# Patient Record
Sex: MALE | Race: WHITE | Employment: FULL TIME | URBAN - METROPOLITAN AREA
[De-identification: names, ages, dates, MRNs, and addresses within clinical notes are randomized per-mention and may not be internally consistent; named-entity substitution may affect disease eponyms.]

---

## 2017-06-06 ENCOUNTER — HOSPITAL ENCOUNTER (EMERGENCY)
Facility: CLINIC | Age: 52
Discharge: HOME | End: 2017-06-07
Attending: EMERGENCY MEDICINE | Admitting: EMERGENCY MEDICINE
Payer: COMMERCIAL

## 2017-06-06 DIAGNOSIS — R00.2 PALPITATIONS: ICD-10-CM

## 2017-06-06 PROCEDURE — 93308 TTE F-UP OR LMTD: CPT

## 2017-06-06 PROCEDURE — 93010 ELECTROCARDIOGRAM REPORT: CPT | Performed by: EMERGENCY MEDICINE

## 2017-06-06 PROCEDURE — 80048 BASIC METABOLIC PNL TOTAL CA: CPT | Performed by: EMERGENCY MEDICINE

## 2017-06-06 PROCEDURE — 96360 HYDRATION IV INFUSION INIT: CPT

## 2017-06-06 PROCEDURE — 99285 EMERGENCY DEPT VISIT HI MDM: CPT | Mod: 25 | Performed by: EMERGENCY MEDICINE

## 2017-06-06 PROCEDURE — 99285 EMERGENCY DEPT VISIT HI MDM: CPT | Mod: 25

## 2017-06-06 PROCEDURE — 93308 TTE F-UP OR LMTD: CPT | Mod: 26 | Performed by: EMERGENCY MEDICINE

## 2017-06-06 PROCEDURE — 84484 ASSAY OF TROPONIN QUANT: CPT | Performed by: EMERGENCY MEDICINE

## 2017-06-06 PROCEDURE — 85025 COMPLETE CBC W/AUTO DIFF WBC: CPT | Performed by: EMERGENCY MEDICINE

## 2017-06-06 PROCEDURE — 93005 ELECTROCARDIOGRAM TRACING: CPT

## 2017-06-06 NOTE — ED AVS SNAPSHOT
[facility]    [address] [address]    Phone:  [phone]    Fax:  [phone]                                       Mark Nathan   MRN: [id number]    Department:  [facility]   Date of Visit:  6/6/2017           Patient Information     Date Of Birth          1965        Your diagnoses for this visit were:     Palpitations        You were seen by Jalil Oneill MD.        Discharge Instructions       Return to the emergency department if you have worsening symptoms, difficulty breathing, or other concerns.  Otherwise follow up in clinic.    Future Appointments        Provider Department Dept Phone Center    6/7/2017 7:00 AM Jose Antonio Lao MD [facility] [phone] [facility]      24 Hour Appointment Hotline       To make an appointment at any [facility], call [phone]). If you don't have a family doctor or clinic, we will help you find one. [facility] are conveniently located to serve the needs of you and your family.             Review of your medicines      Our records show that you are taking the medicines listed below. If these are incorrect, please call your family doctor or clinic.        Dose / Directions Last dose taken    ASPIRIN PO   Dose:  81 mg        Take 81 mg by mouth daily   Refills:  0        BOOST 100 CALORIE SMART PO        Refills:  0        buPROPion 150 MG 12 hr tablet   Commonly known as:  WELLBUTRIN SR        Refills:  10        CENTRUM ULTRA MENS PO   Dose:  1 tablet        Take 1 tablet by mouth   Refills:  0        folic acid-vit B6-vit B12 0.8-10-0.115 MG Tabs per tablet   Commonly known as:  FOLGARD   Dose:  1 tablet        Take 1 tablet by mouth daily   Refills:  0        nicotine 10 MG Inhaler   Commonly known as:  NICOTROL   Dose:  1 cartridge   Quantity:  180 Box        Inhale 1 cartridge into the lungs daily as needed for smoking cessation   Refills:   11        VITAMIN B-1 PO   Dose:  100 mg        Take 100 mg by mouth daily   Refills:  0        VITAMIN D3 PO   Dose:  1000 Units        Take 1,000 Units by mouth daily   Refills:  0                Procedures and tests performed during your visit     Procedure/Test Number of Times Performed    Basic metabolic panel 1    CBC with platelets differential 1    EKG 12 lead 1    POC US ECHO LIMITED 1    Peripheral IV catheter 1    Troponin I 2    XR Chest 2 Views 1      Orders Needing Specimen Collection     None      Pending Results     No orders found for last 3 day(s).            Pending Culture Results     No orders found for last 3 day(s).            Pending Results Instructions     If you had any lab results that were not finalized at the time of your Discharge, you can call the ED Lab Result RN at [phone]. You will be contacted by this team for any positive Lab results or changes in treatment. The nurses are available 7 days a week from 10A to 6:30P.  You can leave a message 24 hours per day and they will return your call.        Test Results From Your Hospital Stay        6/7/2017 12:08 AM      [facility] Procedure Note      Limited Bedside ED Cardiac Ultrasound:    PROCEDURE: PERFORMED BY: Dr. Jalil Oneill  INDICATIONS/SYMPTOM:  Chest Pain  PROBE: Cardiac phased array probe  BODY LOCATION: Chest  FINDINGS:   The ultrasound was performed utilizing the parasternal long axis, parasternal short axis and apical 4 chamber views.  Cardiac contractility:  Present  Gross estimation of cardiac kinesis: normal  Pericardial Effusion:  None  RV:LV ratio: LV > RV  INTERPRETATION:    Chamber size and motion were grossly normal with LV > RV, normal cardiac kinesis.  No pericardial effusion was found.   IMAGE DOCUMENTATION: Images were archived to PACs system.             6/7/2017 12:24 AM      Component Results     Component Value Ref Range & Units Status    Troponin I ES  0.000 - 0.045 ug/L  Final    <0.015  The 99th percentile for upper reference range is 0.045 ug/L.  Troponin values in   the range of 0.045 - 0.120 ug/L may be associated with risks of adverse   clinical events.           6/7/2017 12:24 AM      Component Results     Component Value Ref Range & Units Status    Sodium 137 133 - 144 mmol/L Final    Potassium 3.6 3.4 - 5.3 mmol/L Final    Chloride 105 94 - 109 mmol/L Final    Carbon Dioxide 24 20 - 32 mmol/L Final    Anion Gap 8 3 - 14 mmol/L Final    Glucose 175 (H) 70 - 99 mg/dL Final    Urea Nitrogen 15 7 - 30 mg/dL Final    Creatinine 1.13 0.66 - 1.25 mg/dL Final    GFR Estimate 68 >60 mL/min/1.7m2 Final    Non  GFR Calc    GFR Estimate If Black 83 >60 mL/min/1.7m2 Final    African American GFR Calc    Calcium 7.7 (L) 8.5 - 10.1 mg/dL Final         6/7/2017 12:28 AM      Component Results     Component Value Ref Range & Units Status    WBC 6.9 4.0 - 11.0 10e9/L Final    RBC Count 4.62 4.4 - 5.9 10e12/L Final    Hemoglobin 16.0 13.3 - 17.7 g/dL Final    Hematocrit 44.7 40.0 - 53.0 % Final    MCV 97 78 - 100 fl Final    MCH 34.6 (H) 26.5 - 33.0 pg Final    MCHC 35.8 31.5 - 36.5 g/dL Final    RDW 12.0 10.0 - 15.0 % Final    Platelet Count 221 150 - 450 10e9/L Final    Diff Method Automated Method  Final    % Neutrophils 54.0 % Final    % Lymphocytes 35.7 % Final    % Monocytes 7.2 % Final    % Eosinophils 2.2 % Final    % Basophils 0.6 % Final    % Immature Granulocytes 0.3 % Final    Absolute Neutrophil 3.7 1.6 - 8.3 10e9/L Final    Absolute Lymphocytes 2.5 0.8 - 5.3 10e9/L Final    Absolute Monocytes 0.5 0.0 - 1.3 10e9/L Final    Absolute Eosinophils 0.2 0.0 - 0.7 10e9/L Final    Absolute Basophils 0.0 0.0 - 0.2 10e9/L Final    Abs Immature Granulocytes 0.0 0 - 0.4 10e9/L Final         6/7/2017  2:54 AM      Narrative     CHEST 2 VIEWS  6/7/2017 12:32 AM     HISTORY: Chest pain. Palpitations. Shortness of breath.    COMPARISON: 10/18/2016.    FINDINGS: The lungs are  clear. Normal-sized cardiac silhouette.        Impression     IMPRESSION: No evidence of active cardiopulmonary disease.    HANNY LORENZO MD         6/7/2017  2:55 AM      Component Results     Component Value Ref Range & Units Status    Troponin I ES  0.000 - 0.045 ug/L Final    <0.015  The 99th percentile for upper reference range is 0.045 ug/L.  Troponin values in   the range of 0.045 - 0.120 ug/L may be associated with risks of adverse   clinical events.                  Thank you for choosing [facility]       Thank you for choosing [facility] for your care. Our goal is always to provide you with excellent care. Hearing back from our patients is one way we can continue to improve our services. Please take a few minutes to complete the written survey that you may receive in the mail after you visit with us. Thank you!        Mobile [employer] Information     [employer] gives you secure access to your electronic health record. If you see a primary care provider, you can also send messages to your care team and make appointments. If you have questions, please call your primary care clinic.  If you do not have a primary care provider, please call [phone] and they will assist you.        Care EveryWhere ID     This is your Care EveryWhere ID. This could be used by other organizations to access your [facility] medical records  [id number]        After Visit Summary       This is your record. Keep this with you and show to your community pharmacist(s) and doctor(s) at your next visit.

## 2017-06-06 NOTE — ED AVS SNAPSHOT
[facility]    [address] [address]    Phone:  [phone]    Fax:  [phone]                                       Mark Nathan   MRN: [id number]    Department:  [facility]   Date of Visit:  6/6/2017           After Visit Summary Signature Page     I have received my discharge instructions, and my questions have been answered. I have discussed any challenges I see with this plan with the nurse or doctor.    ..........................................................................................................................................  Patient/Patient Representative Signature      ..........................................................................................................................................  Patient Representative Print Name and Relationship to Patient    ..................................................               ................................................  Date                                            Time    ..........................................................................................................................................  Reviewed by Signature/Title    ...................................................              ..............................................  Date                                                            Time

## 2017-06-07 ENCOUNTER — OFFICE VISIT (OUTPATIENT)
Dept: FAMILY MEDICINE | Facility: CLINIC | Age: 52
End: 2017-06-07
Payer: COMMERCIAL

## 2017-06-07 ENCOUNTER — APPOINTMENT (OUTPATIENT)
Dept: GENERAL RADIOLOGY | Facility: CLINIC | Age: 52
End: 2017-06-07
Attending: EMERGENCY MEDICINE
Payer: COMMERCIAL

## 2017-06-07 VITALS
BODY MASS INDEX: 24.13 KG/M2 | HEIGHT: 68 IN | WEIGHT: 159.2 LBS | SYSTOLIC BLOOD PRESSURE: 133 MMHG | TEMPERATURE: 98.2 F | DIASTOLIC BLOOD PRESSURE: 81 MMHG | OXYGEN SATURATION: 98 % | HEART RATE: 65 BPM

## 2017-06-07 VITALS
DIASTOLIC BLOOD PRESSURE: 83 MMHG | RESPIRATION RATE: 15 BRPM | SYSTOLIC BLOOD PRESSURE: 122 MMHG | OXYGEN SATURATION: 98 % | TEMPERATURE: 98 F | HEART RATE: 80 BPM

## 2017-06-07 DIAGNOSIS — R00.2 PALPITATIONS: ICD-10-CM

## 2017-06-07 DIAGNOSIS — Z72.0 TOBACCO ABUSE: ICD-10-CM

## 2017-06-07 DIAGNOSIS — R73.09 ELEVATED GLUCOSE: ICD-10-CM

## 2017-06-07 DIAGNOSIS — R53.83 FATIGUE, UNSPECIFIED TYPE: Primary | ICD-10-CM

## 2017-06-07 LAB
ANION GAP SERPL CALCULATED.3IONS-SCNC: 8 MMOL/L (ref 3–14)
BASOPHILS # BLD AUTO: 0 10E9/L (ref 0–0.2)
BASOPHILS NFR BLD AUTO: 0.6 %
BUN SERPL-MCNC: 15 MG/DL (ref 7–30)
CALCIUM SERPL-MCNC: 7.7 MG/DL (ref 8.5–10.1)
CHLORIDE SERPL-SCNC: 105 MMOL/L (ref 94–109)
CO2 SERPL-SCNC: 24 MMOL/L (ref 20–32)
CREAT SERPL-MCNC: 1.13 MG/DL (ref 0.66–1.25)
DIFFERENTIAL METHOD BLD: ABNORMAL
EOSINOPHIL # BLD AUTO: 0.2 10E9/L (ref 0–0.7)
EOSINOPHIL NFR BLD AUTO: 2.2 %
ERYTHROCYTE [DISTWIDTH] IN BLOOD BY AUTOMATED COUNT: 12 % (ref 10–15)
GFR SERPL CREATININE-BSD FRML MDRD: 68 ML/MIN/1.7M2
GLUCOSE SERPL-MCNC: 109 MG/DL (ref 70–99)
GLUCOSE SERPL-MCNC: 175 MG/DL (ref 70–99)
HBA1C MFR BLD: 5.7 % (ref 4.3–6)
HCT VFR BLD AUTO: 44.7 % (ref 40–53)
HGB BLD-MCNC: 16 G/DL (ref 13.3–17.7)
IMM GRANULOCYTES # BLD: 0 10E9/L (ref 0–0.4)
IMM GRANULOCYTES NFR BLD: 0.3 %
LYMPHOCYTES # BLD AUTO: 2.5 10E9/L (ref 0.8–5.3)
LYMPHOCYTES NFR BLD AUTO: 35.7 %
MCH RBC QN AUTO: 34.6 PG (ref 26.5–33)
MCHC RBC AUTO-ENTMCNC: 35.8 G/DL (ref 31.5–36.5)
MCV RBC AUTO: 97 FL (ref 78–100)
MONOCYTES # BLD AUTO: 0.5 10E9/L (ref 0–1.3)
MONOCYTES NFR BLD AUTO: 7.2 %
NEUTROPHILS # BLD AUTO: 3.7 10E9/L (ref 1.6–8.3)
NEUTROPHILS NFR BLD AUTO: 54 %
PLATELET # BLD AUTO: 221 10E9/L (ref 150–450)
POTASSIUM SERPL-SCNC: 3.6 MMOL/L (ref 3.4–5.3)
RBC # BLD AUTO: 4.62 10E12/L (ref 4.4–5.9)
SODIUM SERPL-SCNC: 137 MMOL/L (ref 133–144)
TROPONIN I SERPL-MCNC: NORMAL UG/L (ref 0–0.04)
TROPONIN I SERPL-MCNC: NORMAL UG/L (ref 0–0.04)
TSH SERPL DL<=0.005 MIU/L-ACNC: 1.73 MU/L (ref 0.4–4)
WBC # BLD AUTO: 6.9 10E9/L (ref 4–11)

## 2017-06-07 PROCEDURE — 25000132 ZZH RX MED GY IP 250 OP 250 PS 637: Performed by: EMERGENCY MEDICINE

## 2017-06-07 PROCEDURE — 82947 ASSAY GLUCOSE BLOOD QUANT: CPT | Performed by: INTERNAL MEDICINE

## 2017-06-07 PROCEDURE — 84443 ASSAY THYROID STIM HORMONE: CPT | Performed by: INTERNAL MEDICINE

## 2017-06-07 PROCEDURE — 86618 LYME DISEASE ANTIBODY: CPT | Performed by: INTERNAL MEDICINE

## 2017-06-07 PROCEDURE — 86753 PROTOZOA ANTIBODY NOS: CPT | Mod: 90 | Performed by: INTERNAL MEDICINE

## 2017-06-07 PROCEDURE — 99214 OFFICE O/P EST MOD 30 MIN: CPT | Performed by: INTERNAL MEDICINE

## 2017-06-07 PROCEDURE — 96360 HYDRATION IV INFUSION INIT: CPT

## 2017-06-07 PROCEDURE — 25000128 H RX IP 250 OP 636: Performed by: EMERGENCY MEDICINE

## 2017-06-07 PROCEDURE — 71020 XR CHEST 2 VW: CPT

## 2017-06-07 PROCEDURE — 99000 SPECIMEN HANDLING OFFICE-LAB: CPT | Performed by: INTERNAL MEDICINE

## 2017-06-07 PROCEDURE — 86666 EHRLICHIA ANTIBODY: CPT | Mod: 90 | Performed by: INTERNAL MEDICINE

## 2017-06-07 PROCEDURE — 36415 COLL VENOUS BLD VENIPUNCTURE: CPT | Performed by: INTERNAL MEDICINE

## 2017-06-07 PROCEDURE — 83036 HEMOGLOBIN GLYCOSYLATED A1C: CPT | Performed by: INTERNAL MEDICINE

## 2017-06-07 PROCEDURE — 84484 ASSAY OF TROPONIN QUANT: CPT | Performed by: EMERGENCY MEDICINE

## 2017-06-07 RX ORDER — ASPIRIN 81 MG/1
324 TABLET, CHEWABLE ORAL ONCE
Status: COMPLETED | OUTPATIENT
Start: 2017-06-07 | End: 2017-06-07

## 2017-06-07 RX ADMIN — SODIUM CHLORIDE 1000 ML: 9 INJECTION, SOLUTION INTRAVENOUS at 00:11

## 2017-06-07 RX ADMIN — ASPIRIN 81 MG 324 MG: 81 TABLET ORAL at 00:12

## 2017-06-07 NOTE — ED NOTES
Patient feels like he is in a cloudy fog when he fells dizzy  Patient is very anxious patient feels like he can not take a deep breath  Patient is alert  Offers feelings of  palpitation  Patient was at home watching TV when it started. Patient did take and 81 mg  ASA . Feeling weak and dizzy over last week end

## 2017-06-07 NOTE — NURSING NOTE
"Chief Complaint   Patient presents with     Fatigue     x 3 weeks, dizzy, dull headache        Initial /81 (BP Location: Right arm, Patient Position: Chair, Cuff Size: Adult Regular)  Pulse 65  Temp 98.2  F (36.8  C) (Tympanic)  Ht 5' 8\" (1.727 m)  Wt 159 lb 3.2 oz (72.2 kg)  SpO2 98%  BMI 24.21 kg/m2 Estimated body mass index is 24.21 kg/(m^2) as calculated from the following:    Height as of this encounter: 5' 8\" (1.727 m).    Weight as of this encounter: 159 lb 3.2 oz (72.2 kg).  Medication Reconciliation: complete   Hanh ROCHE CMA (AAMA)    "

## 2017-06-07 NOTE — MR AVS SNAPSHOT
"              After Visit Summary   6/7/2017    Mark Nathan    MRN: [id number]           Patient Information     Date Of Birth          1965        Visit Information        Provider Department      6/7/2017 7:00 AM Jose Antonio Lao MD [facility]        Today's Diagnoses     Fatigue, unspecified type    -  1    Palpitations           Follow-ups after your visit        Who to contact     If you have questions or need follow up information about today's clinic visit or your schedule please contact [facility] directly at [phone].  Normal or non-critical lab and imaging results will be communicated to you by Jocooshart, letter or phone within 4 business days after the clinic has received the results. If you do not hear from us within 7 days, please contact the clinic through [employer] or phone. If you have a critical or abnormal lab result, we will notify you by phone as soon as possible.  Submit refill requests through [employer] or call your pharmacy and they will forward the refill request to us. Please allow 3 business days for your refill to be completed.          Additional Information About Your Visit        MyChart Information     [employer] gives you secure access to your electronic health record. If you see a primary care provider, you can also send messages to your care team and make appointments. If you have questions, please call your primary care clinic.  If you do not have a primary care provider, please call [phone] and they will assist you.        Care EveryWhere ID     This is your Care EveryWhere ID. This could be used by other organizations to access your [facility] medical records  [id number]        Your Vitals Were     Pulse Temperature Height Pulse Oximetry BMI (Body Mass Index)       65 98.2  F (36.8  C) (Tympanic) 5' 8\" (1.727 m) 98% 24.21 kg/m2        Blood Pressure from Last 3 Encounters:   06/07/17 133/81   06/07/17 122/83   10/18/16 121/84    Weight from " Last 3 Encounters:   06/07/17 159 lb 3.2 oz (72.2 kg)   10/18/16 160 lb (72.6 kg)   03/21/16 165 lb (74.8 kg)              We Performed the Following     Anaplasma phagocytoph antibody IgG IgM     Babesia antibody IgG IgM     Glucose     Lyme Disease Bisi with reflex to WB Serum     TSH with free T4 reflex        Primary Care Provider    [facility]                Thank you!     Thank you for choosing [facility]  for your care. Our goal is always to provide you with excellent care. Hearing back from our patients is one way we can continue to improve our services. Please take a few minutes to complete the written survey that you may receive in the mail after your visit with us. Thank you!             Your Updated Medication List - Protect others around you: Learn how to safely use, store and throw away your medicines at www.disposemymeds.org.          This list is accurate as of: 6/7/17  7:29 AM.  Always use your most recent med list.                   Brand Name Dispense Instructions for use    ASPIRIN PO      Take 81 mg by mouth daily       BOOST 100 CALORIE SMART PO          buPROPion 150 MG 12 hr tablet    WELLBUTRIN SR         CENTRUM ULTRA MENS PO      Take 1 tablet by mouth       folic acid-vit B6-vit B12 0.8-10-0.115 MG Tabs per tablet    FOLGARD     Take 1 tablet by mouth daily       nicotine 10 MG Inhaler    NICOTROL    180 Box    Inhale 1 cartridge into the lungs daily as needed for smoking cessation       VITAMIN B-1 PO      Take 100 mg by mouth daily       VITAMIN D3 PO      Take 1,000 Units by mouth daily

## 2017-06-07 NOTE — ED NOTES
No complaint of pain in neck or face. Does have some difficulty focusing if he turns to fast  Mild headache  Complains of palpitations constantly.  Patient is a smoker  Patient denies pain with inspiration.  States has HX of numbness in feet  Has been checked for this in past. Has some joint pain occasionally.  No abdominal pain . States that he was bite by deer ticks one month ago. He has nausea off and on  This started after ticks   Patient is still using a large amount of alcohol

## 2017-06-07 NOTE — PROGRESS NOTES
SUBJECTIVE:                                                    Mark Nathan is a 51 year old male who presents to clinic today for the following health issues:      Concern - fatigue, joint pain,      Onset: x 3 weeks    Description:   Patient reports having multiple tick bites 4 - 5 weeks ago and now having symptoms of fatigue, dizziness, headache    Intensity: mild    Progression of Symptoms:  same    Accompanying Signs & Symptoms:  Both middle fingers having some pain w/out injury.  Having a headache all day, every day - never had headaches before. Patient feels all symptoms are related to tick bites.         Previous history of similar problem:   None     Precipitating factors:   Worsened by: none     Alleviating factors:  Improved by: none        Therapies Tried and outcome: Aleve for joint pain - helped w/ joint pain and headache    Mark was seen in the ED last night for palpitations.  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  In the ED, he had labs that showed calcium was a bit low and non-fasting glucose was 175. Other lytes, creatinine, CBC normal.  Troponin negative x 2.  He had a normal CXR and cardiac ultrasound.  EKG showed PVCs as did telemetry, and he was symptomatic with those PVCs.  He received aspirin and IVF after which he reports the PVCs and his palpitations both resolved.  However, he is concerned that the other symptoms he's had recently (fatigue, dizziness, headache, joint aches, trouble focusing vision) may be due to tick born illness.  He had multiple deer tick bites 4-5 weeks ago.  Thinks they were only attached for a max of 12 hours but not entirely sure; they may have been a bit engorged upon removal.  He has a history of a borderline A1C and checks his glucose at home with fasting values between .  He reports he did have a  "bull's eye rash after a tick bite many years ago, but no recent rash.  No fevers, chills, weight change.  Has some intermittent diarrhea and constipation based on his diet.  Does drink alcohol daily.      He wonders if there is anything new for smoking cessation- has tried Chantix (side effects), Wellbutrin and patches (not effective), and currently has a nicotine inhaler and lozenge strips that he is using, which have helped some.        Problem list and histories reviewed & adjusted, as indicated.  Additional history: as documented    Current Outpatient Prescriptions   Medication Sig Dispense Refill     ASPIRIN PO Take 81 mg by mouth daily       Thiamine HCl (VITAMIN B-1 PO) Take 100 mg by mouth daily       Cholecalciferol (VITAMIN D3 PO) Take 1,000 Units by mouth daily       folic acid-vit B6-vit B12 (FOLGARD) 0.8-10-0.115 MG TABS Take 1 tablet by mouth daily       Multiple Vitamins-Minerals (CENTRUM ULTRA MENS PO) Take 1 tablet by mouth       Nutritional Supplements (BOOST 100 CALORIE SMART PO)        nicotine (NICOTROL) 10 MG inhaler Inhale 1 cartridge into the lungs daily as needed for smoking cessation 180 Box 11     No Known Allergies    Reviewed and updated as needed this visit by clinical staff  Tobacco  Allergies  Med Hx  Surg Hx  Fam Hx  Soc Hx      Reviewed and updated as needed this visit by Provider         ROS:  Constitutional, cardiovascular, endocrine, gi systems are negative, except as otherwise noted.    OBJECTIVE:                                                    /81 (BP Location: Right arm, Patient Position: Chair, Cuff Size: Adult Regular)  Pulse 65  Temp 98.2  F (36.8  C) (Tympanic)  Ht 5' 8\" (1.727 m)  Wt 159 lb 3.2 oz (72.2 kg)  SpO2 98%  BMI 24.21 kg/m2  Body mass index is 24.21 kg/(m^2).  GENERAL: healthy, alert and no distress  NECK: no adenopathy, no asymmetry, masses, or scars and thyroid normal to palpation  RESP: lungs clear to auscultation - no rales, rhonchi or " wheezes  CV: regular rate and rhythm, normal S1 S2, no S3 or S4, no murmur, click or rub, no peripheral edema   ABDOMEN: soft, nontender, no hepatosplenomegaly, no masses and bowel sounds normal  MSK: some prominence of the 3rd MCP joints on both hands    Diagnostic Test Results:  Results for orders placed or performed in visit on 06/07/17 (from the past 24 hour(s))   Hemoglobin A1c   Result Value Ref Range    Hemoglobin A1C 5.7 4.3 - 6.0 %        ASSESSMENT/PLAN:                                                          1. Fatigue, unspecified type  2. Palpitations  3. Elevated glucose    Mark presents with fatigue, headache, joint pain, and had an episodes of palpitations yesterday that correlated with PVCs on telemetry.  The PVCs and palpitations resolved with IVF, so may have been due to dehydration.  He had some tick bites 4-5 weeks ago with what appeared to be deer ticks.  He thinks they were attached 12 hours maximum so less likely to have transmitted disease if this is the case, but he reports one was slightly engorged so will check for tick borne illnesses in case it was in fact on longer.  Will also check thyroid function.  Non-fasting glucose was elevated in ED yesterday and he has a history of borderline A1C of 6, so did recheck A1C and it is slightly improved to 5.7.      - Anaplasma phagocytoph antibody IgG IgM  - Babesia antibody IgG IgM  - Lyme Disease Bisi with reflex to WB Serum  - TSH with free T4 reflex  - Glucose  - Hemoglobin A1c    4. Tobacco abuse    He enquires if there are new cessation medications available, but unfortunately it sounds like he has already tried all of the available options.  He will continue to use his current inhaler and lozenges.      Jose Antonio Lao MD  [facility]

## 2017-06-07 NOTE — DISCHARGE INSTRUCTIONS
Return to the emergency department if you have worsening symptoms, difficulty breathing, or other concerns.  Otherwise follow up in clinic.

## 2017-06-07 NOTE — LETTER
[facility]  [address] [address]  Phone: [phone]    June 12, 2017    Mark Venita  [address] [address]          Dear Melany Cruzbrittanie,    The results of your recent lab tests were: Your tests for babesia and anaplasmosis which are two tick borne illnesses are negative.  Component      Latest Ref Rng & Units 6/7/2017   A Phagocytophil IgG       <1:80 . . .   A Phagocytophil IgM       < 1:16 . . .   Babesia IgG       < 1:16 . . .   Babesia IgM       <1:20 . . .     If you have any further questions or problems, please contact our office.    Sincerely,      Gus Lao MD / flory

## 2017-06-07 NOTE — ED PROVIDER NOTES
History     Chief Complaint   Patient presents with     Chest Pain     ppounding in chest like some one hitting with rubber mallet     Tachycardia     Dizziness     HPI  Mark Nathan is a 51 year old male who presents for palpitations.  Symptoms started about 20 minutes prior to arrival while he was watching television.  Started suddenly he felt occasional spasms of pain in his chest that felt like he was being hit by a rubber mallet.  He feels somewhat short of breath and lightheaded.  He says the pain occurs for about one 2nd time and then goes away in between episodes, happens once or twice per minute, sometimes more sometimes lasts.  He's never had anything like this before.  No diaphoresis.  He feels somewhat nauseated but no vomiting.  He has had a recent mild headache and is taking the occasional aspirin for this.  He denies fever, chills, abdominal pain, diarrhea, dysuria, or rash.    Past medical history includes depression  Daily medications include bupropion, aspirin  No known drug allergies  Current every day smoker; heavy alcohol use, daily drinker, says only one beer today; denies illicit drug use    I have reviewed the Medications, Allergies, Past Medical and Surgical History, and Social History in the Epic system.    Review of Systems  Pertinent positives and negatives listed in the HPI, all other systems reviewed and are negative.    Physical Exam   /80  Pulse 80  Temp 98  F (36.7  C) (Oral)  Resp 10  SpO2 98%   Physical Exam   Constitutional: He is oriented to person, place, and time. He appears well-developed and well-nourished. He appears distressed.   HENT:   Head: Normocephalic and atraumatic.   Right Ear: External ear normal.   Left Ear: External ear normal.   Nose: Nose normal.   Eyes: Conjunctivae are normal. No scleral icterus.   Neck: Normal range of motion.   Cardiovascular: Normal rate and regular rhythm.    No murmur heard.  Occasional PVC on the monitor corresponding to  patient's symptoms   Pulmonary/Chest: Effort normal. No stridor. No respiratory distress. He has no wheezes. He has no rales.   Abdominal: Soft. He exhibits no distension.   Neurological: He is alert and oriented to person, place, and time. No cranial nerve deficit. He exhibits normal muscle tone. Coordination and gait normal. GCS eye subscore is 4. GCS verbal subscore is 5. GCS motor subscore is 6.   No dysmetria.  No dysdiadochokinesis.   Skin: Skin is warm and dry. He is not diaphoretic.   Psychiatric: He has a normal mood and affect. His behavior is normal.   Nursing note and vitals reviewed.      ED Course     ED Course     Procedures  Results for orders placed during the hospital encounter of 06/06/17   POC US ECHO LIMITED    [facility] Procedure Note      Limited Bedside ED Cardiac Ultrasound:    PROCEDURE: PERFORMED BY: Dr. Jalil Oneill  INDICATIONS/SYMPTOM:  Chest Pain  PROBE: Cardiac phased array probe  BODY LOCATION: Chest  FINDINGS:   The ultrasound was performed utilizing the parasternal long axis, parasternal short axis and apical 4 chamber views.  Cardiac contractility:  Present  Gross estimation of cardiac kinesis: normal  Pericardial Effusion:  None  RV:LV ratio: LV > RV  INTERPRETATION:    Chamber size and motion were grossly normal with LV > RV, normal cardiac kinesis.  No pericardial effusion was found.   IMAGE DOCUMENTATION: Images were archived to PACs system.                 EKG Interpretation:      Interpreted by Jalil Oneill  Time reviewed: 2350  Symptoms at time of EKG: Palpitations   Rhythm: normal sinus   Rate: normal  Axis: normal  Ectopy: Occasional PVC  Conduction: normal  ST Segments/ T Waves: No ST-T wave changes  Q Waves: none  Comparison to prior: Unchanged    Clinical Impression: normal EKG      Critical Care time:  none               Labs Ordered and Resulted from Time of ED Arrival Up to the Time of Departure from the ED   BASIC METABOLIC PANEL  - Abnormal; Notable for the following:        Result Value    Glucose 175 (*)     Calcium 7.7 (*)     All other components within normal limits   CBC WITH PLATELETS DIFFERENTIAL - Abnormal; Notable for the following:     MCH 34.6 (*)     All other components within normal limits   TROPONIN I   PERIPHERAL IV CATHETER       Assessments & Plan (with Medical Decision Making)   51-year-old male who presents for palpitations.  Differential includes dysrhythmia, electrolyte abnormality, anemia, ACS.  EKG normal sinus rhythm without signs of ischemia.  Blood pressure 122/80, heart rate 69, temperature 36.7 C, SPO2 98% on room air.  Troponin normal.  Electrolytes normal.  He is given IV fluids and aspirin with improvement in symptoms.  Bedside ultrasound shows good cardiac function without dilated right ventricle or pericardial effusion.  Hemoglobin 16.  White blood cell count 6.9.  Chest x-ray obtained, images reviewed independently as well as radiology read reviewed, no signs of infiltrate or pneumothorax or widened mediastinum. Repeat troponin normal.  Less likely ACS.  Asymptomatic now.  He is safe to discharge home with instructions to return if he has worsening symptoms or other concerns, otherwise follow up in clinic in the morning.  The patient is in agreement with this plan.  We did discuss alcohol cessation and he is interested.    I have reviewed the nursing notes.    I have reviewed the findings, diagnosis, plan and need for follow up with the patient.    New Prescriptions    No medications on file       Final diagnoses:   Palpitations       6/6/2017   [facility] EMERGENCY DEPARTMENT     Jalil Oneill MD  06/07/17 0313

## 2017-06-08 LAB — B BURGDOR IGG+IGM SER QL: 0.05 (ref 0–0.89)

## 2017-06-10 LAB
A PHAGOCYTOPH IGG TITR SER IF: NORMAL {TITER}
A PHAGOCYTOPH IGM TITR SER IF: NORMAL {TITER}
B MICROTI IGG TITR SER: NORMAL {TITER}
B MICROTI IGM TITR SER: NORMAL {TITER}

## 2017-09-18 ENCOUNTER — TELEPHONE (OUTPATIENT)
Dept: FAMILY MEDICINE | Facility: CLINIC | Age: 52
End: 2017-09-18

## 2017-09-18 NOTE — LETTER
September 18, 2017      Mark Nathan  [address] [place] [id number]        Dear Dr. Tashia Flores's Care Team has been reviewing your chart and at this time you are due for a colonoscopy or FIT test.  Please call and schedule an appointment.   We are trying to help our patients achieve and maintain their health care goals.  If you are receiving your healthcare at another facility, please notify us of that as well, as we will update your chart.      If you have any questions, please feel free to call the clinic at [phone].    Thank you.            Sincerely,        Jose Antonio Lao MD/eb

## 2017-09-18 NOTE — TELEPHONE ENCOUNTER
Panel Management Review      Patient has the following on his problem list: None      Composite cancer screening  Chart review shows that this patient is due/due soon for the following Colonoscopy  Summary:    Patient is due/failing the following:   COLONOSCOPY    Action needed:   Patient needs referral/order: colonoscopy    Type of outreach:    Sent letter.    Questions for provider review:    None                                                                                                                                    Hanh ROCHE CMA ([facility])

## 2018-04-05 ENCOUNTER — HOSPITAL ENCOUNTER (EMERGENCY)
Facility: CLINIC | Age: 53
Discharge: HOME | End: 2018-04-05
Attending: FAMILY MEDICINE | Admitting: FAMILY MEDICINE
Payer: COMMERCIAL

## 2018-04-05 ENCOUNTER — APPOINTMENT (OUTPATIENT)
Dept: GENERAL RADIOLOGY | Facility: CLINIC | Age: 53
End: 2018-04-05
Attending: FAMILY MEDICINE
Payer: COMMERCIAL

## 2018-04-05 VITALS
HEART RATE: 100 BPM | WEIGHT: 165 LBS | TEMPERATURE: 98.8 F | BODY MASS INDEX: 25.09 KG/M2 | RESPIRATION RATE: 18 BRPM | SYSTOLIC BLOOD PRESSURE: 163 MMHG | DIASTOLIC BLOOD PRESSURE: 102 MMHG | OXYGEN SATURATION: 98 %

## 2018-04-05 DIAGNOSIS — S93.402A SPRAIN OF LEFT ANKLE, UNSPECIFIED LIGAMENT, INITIAL ENCOUNTER: ICD-10-CM

## 2018-04-05 PROCEDURE — 99284 EMERGENCY DEPT VISIT MOD MDM: CPT | Mod: Z6 | Performed by: FAMILY MEDICINE

## 2018-04-05 PROCEDURE — 73610 X-RAY EXAM OF ANKLE: CPT | Mod: LT

## 2018-04-05 PROCEDURE — 99283 EMERGENCY DEPT VISIT LOW MDM: CPT

## 2018-04-05 NOTE — ED AVS SNAPSHOT
[facility] Emergency Department    [address] [address]    Phone:  [phone]    Fax:  [phone]                                       Mark Nathan   MRN: [id number]    Department:  [facility] Emergency Department   Date of Visit:  4/5/2018           Patient Information     Date Of Birth          1965        Your diagnoses for this visit were:     Sprain of left ankle, unspecified ligament, initial encounter        You were seen by Prateek Platt MD.        Discharge Instructions       Return to the Emergency Room if the following occurs:     Worsened pain, fever >101, or for any concern at anytime.    Or, follow-up with the following provider as we discussed:     Return to your primary doctor as needed, or if not improved over the next 7 days.    Medications discussed:    Ibuprofen 600 mg every six hours for pain (7 days duration).  Tylenol 1000 mg every six hours for pain (7 days duration).  Therefore, you can alternate these every three hours and do it safely.    If you received pain-relieving or sedating medication during your time in the ER, avoid alcohol, driving automobiles, or working with machinery.  Also, a responsible adult must stay with you.        Call the Nurse Advice Line at [phone] or [phone] for any concern at anytime.      24 Hour Appointment Hotline       To make an appointment at any [facility] clinic, call [phone] ([phone]). If you don't have a family doctor or clinic, we will help you find one. [facility] clinics are conveniently located to serve the needs of you and your family.             Review of your medicines      Our records show that you are taking the medicines listed below. If these are incorrect, please call your family doctor or clinic.        Dose / Directions Last dose taken    ASPIRIN PO   Dose:  81 mg        Take 81 mg by mouth daily   Refills:  0        BOOST 100 CALORIE SMART PO        Refills:  0         CENTRUM ULTRA MENS PO   Dose:  1 tablet        Take 1 tablet by mouth daily   Refills:  0        folic acid-vit B6-vit B12 0.8-10-0.115 MG Tabs per tablet   Commonly known as:  FOLGARD   Dose:  1 tablet        Take 1 tablet by mouth daily   Refills:  0        nicotine 10 MG Inhaler   Commonly known as:  NICOTROL   Dose:  1 cartridge.   Quantity:  180 Box        Inhale 1 cartridge into the lungs daily as needed for smoking cessation   Refills:  11        VITAMIN B-1 PO   Dose:  100 mg        Take 100 mg by mouth daily   Refills:  0        VITAMIN D3 PO   Dose:  1000 Units        Take 1,000 Units by mouth daily   Refills:  0                Procedures and tests performed during your visit     XR Ankle Left G/E 3 Views      Orders Needing Specimen Collection     None      Pending Results     No orders found from 4/3/2018 to 4/6/2018.            Pending Culture Results     No orders found from 4/3/2018 to 4/6/2018.            Pending Results Instructions     If you had any lab results that were not finalized at the time of your Discharge, you can call the ED Lab Result RN at [phone]. You will be contacted by this team for any positive Lab results or changes in treatment. The nurses are available 7 days a week from 10A to 6:30P.  You can leave a message 24 hours per day and they will return your call.        Test Results From Your Hospital Stay        4/5/2018  9:20 PM      Narrative     ANKLE THREE VIEWS LEFT 4/5/2018 9:00 PM     HISTORY: fall, inversion inj;     COMPARISON: None.    FINDINGS: There is normal osseous alignment.  No fractures are  identified.        Impression     IMPRESSION: Osseous structures appear intact.    PINEDA SUTHERLAND MD                Thank you for choosing [facility]       Thank you for choosing [facility] for your care. Our goal is always to provide you with excellent care. Hearing back from our patients is one way we can continue to improve our services. Please take a few minutes to complete  the written survey that you may receive in the mail after you visit with us. Thank you!        [employer] Information     [facility] gives you secure access to your electronic health record. If you see a primary care provider, you can also send messages to your care team and make appointments. If you have questions, please call your primary care clinic.  If you do not have a primary care provider, please call [phone] and they will assist you.        Care EveryWhere ID     This is your Care EveryWhere ID. This could be used by other organizations to access your [facility] medical records  [id number]        Equal Access to Services     [facility] : Rena Carroll, dez scruggs, susan wilkins, tavon jacob. So [facility] [phone].    ATENCIÓN: Si habla español, tiene a mendes disposición servicios gratuitos de asistencia lingüística. Leightoname al [phone].    We comply with applicable federal civil rights laws and [place] laws. We do not discriminate on the basis of race, color, national origin, age, disability, sex, sexual orientation, or gender identity.            After Visit Summary       This is your record. Keep this with you and show to your community pharmacist(s) and doctor(s) at your next visit.

## 2018-04-05 NOTE — LETTER
April 5, 2018      To Whom It May Concern:      Mark Nathan was seen in our Emergency Department today, 04/05/18.  I expect his condition to improve over the next 2-3 days.  He should avoid frequent standing or walking during this period of time.    Sincerely,        Prateek Platt MD

## 2018-04-05 NOTE — ED AVS SNAPSHOT
[facility] Emergency Department    [address] [address]    Phone:  [phone]    Fax:  [phone]                                       Mark Nathan   MRN: [id number]    Department:  [facility] Emergency Department   Date of Visit:  4/5/2018           After Visit Summary Signature Page     I have received my discharge instructions, and my questions have been answered. I have discussed any challenges I see with this plan with the nurse or doctor.    ..........................................................................................................................................  Patient/Patient Representative Signature      ..........................................................................................................................................  Patient Representative Print Name and Relationship to Patient    ..................................................               ................................................  Date                                            Time    ..........................................................................................................................................  Reviewed by Signature/Title    ...................................................              ..............................................  Date                                                            Time

## 2018-04-06 NOTE — DISCHARGE INSTRUCTIONS
Return to the Emergency Room if the following occurs:     Worsened pain, fever >101, or for any concern at anytime.    Or, follow-up with the following provider as we discussed:     Return to your primary doctor as needed, or if not improved over the next 7 days.    Medications discussed:    Ibuprofen 600 mg every six hours for pain (7 days duration).  Tylenol 1000 mg every six hours for pain (7 days duration).  Therefore, you can alternate these every three hours and do it safely.    If you received pain-relieving or sedating medication during your time in the ER, avoid alcohol, driving automobiles, or working with machinery.  Also, a responsible adult must stay with you.        Call the Nurse Advice Line at [phone] or [phone] for any concern at anytime.

## 2018-04-06 NOTE — ED NOTES
Pt presents to ED after slipping on the ice and falling today. Pt fell from standing. Pt did not hit head. Pt did not lose consciousness. Pt denies taking blood thinners. Pt complains of pain in the left ankle. Pt tried ibuprofen without much relief.

## 2018-04-06 NOTE — ED PROVIDER NOTES
HPI  Patient is a 52-year-old male presenting with injury to his left ankle.  No prior injury or surgery on the left ankle or foot is reported.  Past medical history is reviewed.  Medications are reviewed.  The injury occurred about an hour prior to arrival.  It occurred while he was at home.  He walked outside and slipped.  The left foot inverted and he landed on top of the foot and ankle.  He has had difficulty bearing weight on the left foot since the injury.  There is local swelling.  He denies proximal leg pain.  He denies foot pain.  No other injury reported.    ROS: All other review of systems are negative other than that noted above.      Past Medical History:   Diagnosis Date     Alcohol dependence in remission (H) 3/21/2016     No past surgical history on file.  Family History   Problem Relation Age of Onset     DIABETES Father      DIABETES Paternal Grandfather      DIABETES Paternal Uncle      DIABETES Paternal Uncle      Social History   Substance Use Topics     Smoking status: Current Every Day Smoker     Packs/day: 1.00     Years: 35.00     Types: Cigarettes     Smokeless tobacco: Not on file     Alcohol use No      Comment: quit alcohol 2/22/16         PHYSICAL  BP (!) 163/102  Pulse 100  Temp 98.8  F (37.1  C) (Temporal)  Resp 18  Wt 74.8 kg (165 lb)  SpO2 98%  BMI 25.09 kg/m2  General: Patient is alert and in moderate distress.  Neurological: Alert.  Moving upper and lower extremities equally, bilaterally.  Head / Neck: Atraumatic.  Ears: Not done.  Eyes: Pupils are equal, round, and reactive.  Normal conjunctiva.  Nose: Midline.  No epistaxis.  Mouth / Throat:  Moist. Respiratory: No respiratory distress.  Cardiovascular: Regular rhythm.  Peripheral extremities are warm.  Abdomen / Pelvis: Not done.  Genitalia: Not done.  Musculoskeletal: Obvious swelling involving the left ankle.  Tender over the lateral malleolus.  No tenderness over the proximal fibula.  No tenderness over the proximal  fifth metatarsal.  No tenderness over the midfoot.  Skin: No evidence of rash or trauma.        PHYSICIAN  Patient will have an x-ray to look for fracture.  He refuses analgesia here.    IMAGING  Images reviewed by me.  Radiology report also reviewed.  XR Ankle Left G/E 3 Views   Final Result   IMPRESSION: Osseous structures appear intact.      PINEDA SUTHERLAND MD        X-ray unremarkable.  Ace wrap provided.  Crutches to be used only over the next 2-3 days.  Ibuprofen and Tylenol for pain control.  Ankle sprain described.  Follow-up discussed.      IMPRESSION    ICD-10-CM    1. Sprain of left ankle, unspecified ligament, initial encounter S93.402A             Prateek Platt MD  04/05/18 2152

## 2018-04-12 ENCOUNTER — OFFICE VISIT (OUTPATIENT)
Dept: FAMILY MEDICINE | Facility: CLINIC | Age: 53
End: 2018-04-12
Payer: COMMERCIAL

## 2018-04-12 VITALS
HEIGHT: 68 IN | DIASTOLIC BLOOD PRESSURE: 98 MMHG | SYSTOLIC BLOOD PRESSURE: 142 MMHG | HEART RATE: 88 BPM | BODY MASS INDEX: 24.72 KG/M2 | OXYGEN SATURATION: 99 % | WEIGHT: 163.1 LBS | TEMPERATURE: 97 F

## 2018-04-12 DIAGNOSIS — S93.402D SPRAIN OF LEFT ANKLE, UNSPECIFIED LIGAMENT, SUBSEQUENT ENCOUNTER: Primary | ICD-10-CM

## 2018-04-12 PROCEDURE — 99213 OFFICE O/P EST LOW 20 MIN: CPT | Performed by: INTERNAL MEDICINE

## 2018-04-12 NOTE — LETTER
[facility]  [address] [address]  Phone: [phone]    April 12, 2018        Mark Nathan  [address] [facility] [address]          To whom it may concern:    RE: Mark Nathan    Patient was seen and treated today at our clinic.  He should be excused from work and may return to work Monday, April 16th with to restrictions.     Please contact me for questions or concerns.      Sincerely,        Jose Antonio Lao MD

## 2018-04-12 NOTE — PROGRESS NOTES
SUBJECTIVE:   Mark Nathan is a 52 year old male who presents to clinic today for the following health issues:  Chief Complaint   Patient presents with     ER F/U     seen 4/5/18  [place]     Health Maintenance     patient deferred colonoscopy        ED/UC Followup:    Facility:  [facility]   Date of visit: 4/5/18  Reason for visit: ankle injury  Current Status: PATIENT reports injury was getting better, but worked Monday 4/9/18 and Tuesday 4/10/18 and walked about 10 - 12 miles at work and ankle really swelled and having a lot of leg pain.  Patient reports then yesterday stayed home and applied ice, rest and elevation.    Taking ibuprofen for the pain.       Mark was seen in the ED on 4/5 after slipping and falling injuring his left ankle.  X-ray done in the ED was negative and he was diagnosed with a sprain, instructed to RICE and use ibuprofen and Tylenol for pain.  He has been doing the RICE and using ibuprofen and was improving until he returned to work, where he needs to walk a lot.  Having pain all over the ankle but most prominent on the lateral side.  Had worsening bruising and swelling.  Calves hurt on both sides from limping around.      Problem list and histories reviewed & adjusted, as indicated.  Additional history: as documented    Patient Active Problem List   Diagnosis     Tobacco abuse     Numbness in both legs     Elevated glucose     Alcohol dependence in remission (H)     History reviewed. No pertinent surgical history.    Social History   Substance Use Topics     Smoking status: Current Every Day Smoker     Packs/day: 1.00     Years: 35.00     Types: Cigarettes     Smokeless tobacco: Never Used     Alcohol use No      Comment: quit alcohol 2/22/16     Family History   Problem Relation Age of Onset     DIABETES Father      DIABETES Paternal Grandfather      DIABETES Paternal Uncle      DIABETES Paternal Uncle          Current Outpatient Prescriptions   Medication Sig Dispense Refill      "diclofenac (VOLTAREN) 50 MG EC tablet Take 1 tablet (50 mg) by mouth 3 times daily as needed for moderate pain 60 tablet 1     ASPIRIN PO Take 81 mg by mouth daily       Thiamine HCl (VITAMIN B-1 PO) Take 100 mg by mouth daily       Cholecalciferol (VITAMIN D3 PO) Take 1,000 Units by mouth daily       folic acid-vit B6-vit B12 (FOLGARD) 0.8-10-0.115 MG TABS Take 1 tablet by mouth daily       Multiple Vitamins-Minerals (CENTRUM ULTRA MENS PO) Take 1 tablet by mouth daily        Nutritional Supplements (BOOST 100 CALORIE SMART PO)        nicotine (NICOTROL) 10 MG inhaler Inhale 1 cartridge into the lungs daily as needed for smoking cessation (Patient not taking: Reported on 4/12/2018) 180 Box 11     No Known Allergies    Reviewed and updated as needed this visit by clinical staff  Tobacco  Allergies  Med Hx  Surg Hx  Fam Hx  Soc Hx      Reviewed and updated as needed this visit by Provider         ROS:  Constitutional, MSK systems are negative, except as otherwise noted.    OBJECTIVE:     BP (!) 142/98 (BP Location: Right arm, Cuff Size: Adult Regular)  Pulse 88  Temp 97  F (36.1  C) (Tympanic)  Ht 5' 8\" (1.727 m)  Wt 163 lb 1.6 oz (74 kg)  SpO2 99%  BMI 24.8 kg/m2  Body mass index is 24.8 kg/(m^2).  GENERAL: healthy, alert and no distress  MS: left ankle swelling through the foot with tenderness in all areas of the ankle but worst laterally, has some bruising along the heel and toes    ASSESSMENT/PLAN:       1. Sprain of left ankle, unspecified ligament, subsequent encounter    Mark presents with worsening pain in his sprained ankle after returning to work.  He would like to try a stronger anti-inflammatory, so we'll try some diclofenac, can alternate this with Tylenol.  Continue RICE.  Note provided to remain off work for the next few days and hopefully return on Monday.  He is not able to do light duty.     - diclofenac (VOLTAREN) 50 MG EC tablet; Take 1 tablet (50 mg) by mouth 3 times daily as needed " for moderate pain  Dispense: 60 tablet; Refill: 1    BP was high, likely secondary to pain.  Recommend recheck with RN when feeling better.     Jose Antonio Lao MD  [facility]

## 2018-04-12 NOTE — NURSING NOTE
"Chief Complaint   Patient presents with     ER F/U     seen 4/5/18 [facility]     patient deferred colonoscopy        Initial BP (!) 148/92 (BP Location: Right arm, Patient Position: Chair, Cuff Size: Adult Regular)  Pulse 88  Temp 97  F (36.1  C) (Tympanic)  Ht 5' 8\" (1.727 m)  Wt 163 lb 1.6 oz (74 kg)  SpO2 99%  BMI 24.8 kg/m2 Estimated body mass index is 24.8 kg/(m^2) as calculated from the following:    Height as of this encounter: 5' 8\" (1.727 m).    Weight as of this encounter: 163 lb 1.6 oz (74 kg).  Medication Reconciliation: complete   Hanh ROCHE CMA ([facility])    "

## 2018-04-12 NOTE — MR AVS SNAPSHOT
After Visit Summary   4/12/2018    Mark Nathan    MRN: [id number]           Patient Information     Date Of Birth          1965        Visit Information        Provider Department      4/12/2018 8:00 AM Jose Antonio Lao MD [facility]        Today's Diagnoses     Sprain of left ankle, unspecified ligament, subsequent encounter    -  1      Care Instructions    Take the diclofenac in place of ibuprofen (Advil) and naproxen (Aleve).  You can alternate it with Tylenol though.    Let me know if you're not able to return to work by Monday.     Please return after your pain has resolved to have a free nurse blood pressure check to make sure your blood pressure is back to normal.           Follow-ups after your visit        Who to contact     If you have questions or need follow up information about today's clinic visit or your schedule please contact [facility] directly at [phone].  Normal or non-critical lab and imaging results will be communicated to you by Dry[employer], letter or phone within 4 business days after the clinic has received the results. If you do not hear from us within 7 days, please contact the clinic through [employer] or phone. If you have a critical or abnormal lab result, we will notify you by phone as soon as possible.  Submit refill requests through [employer] or call your pharmacy and they will forward the refill request to us. Please allow 3 business days for your refill to be completed.          Additional Information About Your Visit        MyChart Information     [employer] gives you secure access to your electronic health record. If you see a primary care provider, you can also send messages to your care team and make appointments. If you have questions, please call your primary care clinic.  If you do not have a primary care provider, please call [phone] and they will assist you.        Care EveryWhere ID     This is your Care EveryWhere ID. This  "could be used by other organizations to access your [place] medical records  [id number]        Your Vitals Were     Pulse Temperature Height Pulse Oximetry BMI (Body Mass Index)       88 97  F (36.1  C) (Tympanic) 5' 8\" (1.727 m) 99% 24.8 kg/m2        Blood Pressure from Last 3 Encounters:   04/12/18 (!) 142/98   04/05/18 (!) 163/102   06/07/17 133/81    Weight from Last 3 Encounters:   04/12/18 163 lb 1.6 oz (74 kg)   04/05/18 165 lb (74.8 kg)   06/07/17 159 lb 3.2 oz (72.2 kg)              Today, you had the following     No orders found for display         Today's Medication Changes          These changes are accurate as of 4/12/18  8:23 AM.  If you have any questions, ask your nurse or doctor.               Start taking these medicines.        Dose/Directions    diclofenac 50 MG EC tablet   Commonly known as:  VOLTAREN   Used for:  Sprain of left ankle, unspecified ligament, subsequent encounter   Started by:  Jose Antonio Lao MD        Dose:  50 mg   Take 1 tablet (50 mg) by mouth 3 times daily as needed for moderate pain   Quantity:  60 tablet   Refills:  1            Where to get your medicines      [facility] - [address] AT [facility] OF [address] [place] [address]  [address] [facility] [address]     Phone:  [phone]     diclofenac 50 MG EC tablet                Primary Care Provider Office Phone # Fax #    Jose Antonio Lao [phone] [address] [facility] [address]        Equal Access to Services     DILIP HINES AH: Rena Carroll, wabayda luqdiane, qaybta kaaltavon ochoa. So [facility] [phone].    ATENCIÓN: Si habla español, tiene a mendes disposición servicios gratuitos de asistencia lingüística. Michelle al [phone].    We comply with applicable federal civil rights laws and [place] laws. We do not discriminate on the basis of race, color, " national origin, age, disability, sex, sexual orientation, or gender identity.            Thank you!     Thank you for choosing [facility]  for your care. Our goal is always to provide you with excellent care. Hearing back from our patients is one way we can continue to improve our services. Please take a few minutes to complete the written survey that you may receive in the mail after your visit with us. Thank you!             Your Updated Medication List - Protect others around you: Learn how to safely use, store and throw away your medicines at www.disposemymeds.org.          This list is accurate as of 4/12/18  8:23 AM.  Always use your most recent med list.                   Brand Name Dispense Instructions for use Diagnosis    ASPIRIN PO      Take 81 mg by mouth daily        BOOST 100 CALORIE SMART PO           CENTRUM ULTRA MENS PO      Take 1 tablet by mouth daily        diclofenac 50 MG EC tablet    VOLTAREN    60 tablet    Take 1 tablet (50 mg) by mouth 3 times daily as needed for moderate pain    Sprain of left ankle, unspecified ligament, subsequent encounter       folic acid-vit B6-vit B12 0.8-10-0.115 MG Tabs per tablet    FOLGARD     Take 1 tablet by mouth daily        nicotine 10 MG Inhaler    NICOTROL    180 Box    Inhale 1 cartridge into the lungs daily as needed for smoking cessation    Tobacco abuse       VITAMIN B-1 PO      Take 100 mg by mouth daily        VITAMIN D3 PO      Take 1,000 Units by mouth daily

## 2018-04-12 NOTE — PATIENT INSTRUCTIONS
Take the diclofenac in place of ibuprofen (Advil) and naproxen (Aleve).  You can alternate it with Tylenol though.    Let me know if you're not able to return to work by Monday.     Please return after your pain has resolved to have a free nurse blood pressure check to make sure your blood pressure is back to normal.

## 2018-11-27 ENCOUNTER — TELEPHONE (OUTPATIENT)
Dept: FAMILY MEDICINE | Facility: CLINIC | Age: 53
End: 2018-11-27

## 2018-11-27 ENCOUNTER — OFFICE VISIT (OUTPATIENT)
Dept: FAMILY MEDICINE | Facility: CLINIC | Age: 53
End: 2018-11-27
Payer: COMMERCIAL

## 2018-11-27 VITALS
OXYGEN SATURATION: 97 % | SYSTOLIC BLOOD PRESSURE: 126 MMHG | BODY MASS INDEX: 25.36 KG/M2 | DIASTOLIC BLOOD PRESSURE: 88 MMHG | RESPIRATION RATE: 14 BRPM | HEART RATE: 67 BPM | WEIGHT: 166.8 LBS | TEMPERATURE: 97.6 F

## 2018-11-27 DIAGNOSIS — M62.81 GENERALIZED MUSCLE WEAKNESS: ICD-10-CM

## 2018-11-27 DIAGNOSIS — Z72.0 TOBACCO ABUSE: Primary | ICD-10-CM

## 2018-11-27 DIAGNOSIS — R42 DIZZINESS: Primary | ICD-10-CM

## 2018-11-27 LAB
ALBUMIN SERPL-MCNC: 3.9 G/DL (ref 3.4–5)
ALP SERPL-CCNC: 90 U/L (ref 40–150)
ALT SERPL W P-5'-P-CCNC: 40 U/L (ref 0–70)
ANION GAP SERPL CALCULATED.3IONS-SCNC: 4 MMOL/L (ref 3–14)
AST SERPL W P-5'-P-CCNC: 21 U/L (ref 0–45)
BILIRUB SERPL-MCNC: 0.4 MG/DL (ref 0.2–1.3)
BUN SERPL-MCNC: 11 MG/DL (ref 7–30)
CALCIUM SERPL-MCNC: 8.9 MG/DL (ref 8.5–10.1)
CHLORIDE SERPL-SCNC: 107 MMOL/L (ref 94–109)
CK SERPL-CCNC: 228 U/L (ref 30–300)
CO2 SERPL-SCNC: 29 MMOL/L (ref 20–32)
CREAT SERPL-MCNC: 1.1 MG/DL (ref 0.66–1.25)
ERYTHROCYTE [DISTWIDTH] IN BLOOD BY AUTOMATED COUNT: 12.3 % (ref 10–15)
GFR SERPL CREATININE-BSD FRML MDRD: 70 ML/MIN/1.7M2
GLUCOSE SERPL-MCNC: 107 MG/DL (ref 70–99)
HBA1C MFR BLD: 5.9 % (ref 0–5.6)
HCT VFR BLD AUTO: 47.7 % (ref 40–53)
HGB BLD-MCNC: 16 G/DL (ref 13.3–17.7)
MAGNESIUM SERPL-MCNC: 2.4 MG/DL (ref 1.6–2.3)
MCH RBC QN AUTO: 33.8 PG (ref 26.5–33)
MCHC RBC AUTO-ENTMCNC: 33.5 G/DL (ref 31.5–36.5)
MCV RBC AUTO: 101 FL (ref 78–100)
PLATELET # BLD AUTO: 209 10E9/L (ref 150–450)
POTASSIUM SERPL-SCNC: 4.4 MMOL/L (ref 3.4–5.3)
PROT SERPL-MCNC: 7.8 G/DL (ref 6.8–8.8)
RBC # BLD AUTO: 4.74 10E12/L (ref 4.4–5.9)
SODIUM SERPL-SCNC: 140 MMOL/L (ref 133–144)
TROPONIN I SERPL-MCNC: <0.015 UG/L (ref 0–0.04)
TSH SERPL DL<=0.005 MIU/L-ACNC: 1.78 MU/L (ref 0.4–4)
WBC # BLD AUTO: 6.7 10E9/L (ref 4–11)

## 2018-11-27 PROCEDURE — 80053 COMPREHEN METABOLIC PANEL: CPT | Performed by: INTERNAL MEDICINE

## 2018-11-27 PROCEDURE — 84484 ASSAY OF TROPONIN QUANT: CPT | Performed by: INTERNAL MEDICINE

## 2018-11-27 PROCEDURE — 93000 ELECTROCARDIOGRAM COMPLETE: CPT | Performed by: INTERNAL MEDICINE

## 2018-11-27 PROCEDURE — 82550 ASSAY OF CK (CPK): CPT | Performed by: INTERNAL MEDICINE

## 2018-11-27 PROCEDURE — 99214 OFFICE O/P EST MOD 30 MIN: CPT | Performed by: INTERNAL MEDICINE

## 2018-11-27 PROCEDURE — 85027 COMPLETE CBC AUTOMATED: CPT | Performed by: INTERNAL MEDICINE

## 2018-11-27 PROCEDURE — 36415 COLL VENOUS BLD VENIPUNCTURE: CPT | Performed by: INTERNAL MEDICINE

## 2018-11-27 PROCEDURE — 83036 HEMOGLOBIN GLYCOSYLATED A1C: CPT | Performed by: INTERNAL MEDICINE

## 2018-11-27 PROCEDURE — 84443 ASSAY THYROID STIM HORMONE: CPT | Performed by: INTERNAL MEDICINE

## 2018-11-27 PROCEDURE — 83735 ASSAY OF MAGNESIUM: CPT | Performed by: INTERNAL MEDICINE

## 2018-11-27 RX ORDER — VARENICLINE TARTRATE 1 MG/1
1 TABLET, FILM COATED ORAL 2 TIMES DAILY
Qty: 56 TABLET | Refills: 2 | Status: SHIPPED | OUTPATIENT
Start: 2018-12-27 | End: 2019-10-10

## 2018-11-27 RX ORDER — MECLIZINE HYDROCHLORIDE 25 MG/1
25 TABLET ORAL EVERY 6 HOURS PRN
Qty: 30 TABLET | Refills: 1 | Status: SHIPPED | OUTPATIENT
Start: 2018-11-27 | End: 2019-10-10

## 2018-11-27 NOTE — TELEPHONE ENCOUNTER
Reason for Call:  Medication request:    Do you use a [facility]?  Name of the pharmacy and phone number for the current request:  [facility] [phone]    Name of the medication requested: Pt saw Dr. Lao in clinic this am and he forgot to ask for an Rx for Chantix.  Please call patient and advise.      Other request:     Can we leave a detailed message on this number? YES    Phone number patient can be reached at: Home number on file [phone] (home)    Best Time: any    Call taken on 11/27/2018 at 1:05 PM by Noelle Almaguer

## 2018-11-27 NOTE — TELEPHONE ENCOUNTER
S-(situation): wants to quit smoking    B-(background): he was evaluated today and forgot to ask for chantix to quit smoking    A-(assessment): he wants to stop smoking. He knows this will help his situation. He is a 1 1/2 pack smoker for 38 years. He has used Chantix in the past but didn't want to quit then. He didn't feel like it was a problem taking it but he wasn't ready to quit. He is ready to quit now     R-(recommendations): please review and send to pharmacy I have it pended if appropriate

## 2018-11-27 NOTE — MR AVS SNAPSHOT
After Visit Summary   11/27/2018    Mark Nathan    MRN: [id number]           Patient Information     Date Of Birth          1965        Visit Information        Provider Department      11/27/2018 9:40 AM Jose Antonio Lao MD [facility]        Today's Diagnoses     Dizziness    -  1    Generalized muscle weakness          Care Instructions      Dizziness (Uncertain Cause)  Dizziness is a common symptom. It may be described as lightheadedness, spinning, or feeling like you are going to faint. Dizziness can have many causes.  Be sure to tell the healthcare provider about:    All medicines you take, including prescription, over-the-counter, herbs, and supplements    Any other symptoms you have    Any health problems you are being treated for    Any past major health problems you've had, such as a heart attack, balance issues, hearing problems, or blood pressure problems    Anything that causes the dizziness to get worse or better  Today's exam did not show an exact cause for your dizziness. Other tests may be needed. Follow up with your healthcare provider.  Home care    Dizziness that occurs with sudden standing may be a sign of mild dehydration. Drink extra fluids for the next few days.    If you recently started a new medicine, stopped a medicine, or had the dose of a current medicine changed, talk with the prescribing healthcare provider. Your medicine plan may need adjustment.    If dizziness lasts more than a few seconds, sit or lie down until it passes. This may help prevent injury in case you pass out. Get up slowly when you feel better.    Don't drive or use power tools or dangerous equipment until you have had no dizziness for at least 48 hours.  Follow-up care  Follow up with your healthcare provider for further evaluation within the next 7 days or as advised.  When to seek medical advice  Call your healthcare provider for any of the following:    Worsening of symptoms or  new symptoms    Passing out or seizure    Repeated vomiting    Headache    Palpitations (the sense that your heart is fluttering or beating fast or hard)    Shortness of breath    Blood in vomit or stool (black or red color)    Weakness of an arm or leg or 1 side of the face    Vision or hearing changes    Trouble walking or speaking    Chest, arm, neck, back, or jaw pain  Date Last Reviewed: 11/1/2017 2000-2018 [employer]. [address] [place] [address], [place] [address]. All rights reserved. This information is not intended as a substitute for professional medical care. Always follow your healthcare professional's instructions.                Follow-ups after your visit        Follow-up notes from your care team     Return in about 1 week (around 12/4/2018), or if symptoms worsen or fail to improve.      Who to contact     If you have questions or need follow up information about today's clinic visit or your schedule please contact [facility] directly at [phone].  Normal or non-critical lab and imaging results will be communicated to you by [employer], letter or phone within 4 business days after the clinic has received the results. If you do not hear from us within 7 days, please contact the clinic through [employer] or phone. If you have a critical or abnormal lab result, we will notify you by phone as soon as possible.  Submit refill requests through [facility] or call your pharmacy and they will forward the refill request to us. Please allow 3 business days for your refill to be completed.          Additional Information About Your Visit        [employer] Information     [facility] gives you secure access to your electronic health record. If you see a primary care provider, you can also send messages to your care team and make appointments. If you have questions, please call your primary care clinic.  If you do not have a primary care provider, please call [phone] and they will assist  you.        Care EveryWhere ID     This is your Care EveryWhere ID. This could be used by other organizations to access your [place] medical records  [id number]        Your Vitals Were     Pulse Temperature Respirations Pulse Oximetry BMI (Body Mass Index)       67 97.6  F (36.4  C) (Tympanic) 14 97% 25.36 kg/m2        Blood Pressure from Last 3 Encounters:   11/27/18 126/88   04/12/18 (!) 142/98   04/05/18 (!) 163/102    Weight from Last 3 Encounters:   11/27/18 166 lb 12.8 oz (75.7 kg)   04/12/18 163 lb 1.6 oz (74 kg)   04/05/18 165 lb (74.8 kg)              We Performed the Following     CBC with platelets     CK total     Comprehensive metabolic panel     EKG 12-lead complete w/read - Clinics     Hemoglobin A1c     Magnesium     Troponin I     TSH with free T4 reflex        Primary Care Provider Office Phone # Fax #    MayuriJamila Lao [phone] [address] [facility] [address]        Equal Access to Services     DILIP HINES : Hadii aad ku hadasho Soomaali, waaxda luqadaha, qaybta kaalmada adeegyada, tavon ba . So [facility] [phone].    ATENCIÓN: Si habla español, tiene a mendes disposición servicios gratuitos de asistencia lingüística. Llame al [phone].    We comply with applicable federal civil rights laws and [place] laws. We do not discriminate on the basis of race, color, national origin, age, disability, sex, sexual orientation, or gender identity.            Thank you!     Thank you for choosing [facility]  for your care. Our goal is always to provide you with excellent care. Hearing back from our patients is one way we can continue to improve our services. Please take a few minutes to complete the written survey that you may receive in the mail after your visit with us. Thank you!             Your Updated Medication List - Protect others around you: Learn how to safely use, store and throw away your medicines at  www.disposemymeds.org.          This list is accurate as of 11/27/18 11:18 AM.  Always use your most recent med list.                   Brand Name Dispense Instructions for use Diagnosis    ASPIRIN PO      Take 81 mg by mouth daily        BOOST 100 CALORIE SMART PO           CENTRUM ULTRA MENS PO      Take 1 tablet by mouth daily        diclofenac 50 MG EC tablet    VOLTAREN    60 tablet    Take 1 tablet (50 mg) by mouth 3 times daily as needed for moderate pain    Sprain of left ankle, unspecified ligament, subsequent encounter       folic acid-vit B6-vit B12 0.8-10-0.115 MG Tabs per tablet    FOLGARD     Take 1 tablet by mouth daily        nicotine 10 MG inhaler    NICOTROL    180 Box    Inhale 1 cartridge into the lungs daily as needed for smoking cessation    Tobacco abuse       VITAMIN B-1 PO      Take 100 mg by mouth daily        VITAMIN D3 PO      Take 1,000 Units by mouth daily

## 2018-11-27 NOTE — PATIENT INSTRUCTIONS
Dizziness (Uncertain Cause)  Dizziness is a common symptom. It may be described as lightheadedness, spinning, or feeling like you are going to faint. Dizziness can have many causes.  Be sure to tell the healthcare provider about:    All medicines you take, including prescription, over-the-counter, herbs, and supplements    Any other symptoms you have    Any health problems you are being treated for    Any past major health problems you've had, such as a heart attack, balance issues, hearing problems, or blood pressure problems    Anything that causes the dizziness to get worse or better  Today's exam did not show an exact cause for your dizziness. Other tests may be needed. Follow up with your healthcare provider.  Home care    Dizziness that occurs with sudden standing may be a sign of mild dehydration. Drink extra fluids for the next few days.    If you recently started a new medicine, stopped a medicine, or had the dose of a current medicine changed, talk with the prescribing healthcare provider. Your medicine plan may need adjustment.    If dizziness lasts more than a few seconds, sit or lie down until it passes. This may help prevent injury in case you pass out. Get up slowly when you feel better.    Don't drive or use power tools or dangerous equipment until you have had no dizziness for at least 48 hours.  Follow-up care  Follow up with your healthcare provider for further evaluation within the next 7 days or as advised.  When to seek medical advice  Call your healthcare provider for any of the following:    Worsening of symptoms or new symptoms    Passing out or seizure    Repeated vomiting    Headache    Palpitations (the sense that your heart is fluttering or beating fast or hard)    Shortness of breath    Blood in vomit or stool (black or red color)    Weakness of an arm or leg or 1 side of the face    Vision or hearing changes    Trouble walking or speaking    Chest, arm, neck, back, or jaw pain  Date  Last Reviewed: 11/1/2017 2000-2018 [employer]. [address] [place] [address], [place] [address]. All rights reserved. This information is not intended as a substitute for professional medical care. Always follow your healthcare professional's instructions.

## 2018-11-27 NOTE — PROGRESS NOTES
"  SUBJECTIVE:   Mark Nathan is a 53 year old male who presents to clinic today for the following health issues:    Chief Complaint   Patient presents with     Dizziness     started today, very weak       Dizziness  Onset: started today.  Took a shower and was feeling.  Went to work and changed into his uniform and then all of sudden he got all funny and weak and exhausted.  This occurred at about 8am.  The severe symptoms lasted about 15 minutes and mild symptoms have persisted since then.  He did check a blood sugar at home and it was 108.     Description:   Do you feel faint:  no   Does it feel like the surroundings (bed, room) are moving: YES- with head movement- feels like his brain isn't keeping up with his eyes, peripheral is blurry and then vision \"jumps into view\", central vision looks almost too sharp.    Unsteady/off balance: YES  Have you passed out or fallen: no - felt like he was going to fall, but he didn't actually fall.      Intensity: mild to moderate    Progression of Symptoms:  improving and waxing and waning    Accompanying Signs & Symptoms:  Heart palpitations: no   Nausea, vomiting: YES- queasiness, no vomiting    Weakness in arms or legs: YES- very weak, patient reports whole body feels very weak like he did not sleep at all and feels like he ran a marathon.  Right hand has been cramping up on and off for months and he is having shooting pains down both legs to the feet, which are tingling and numb.   Fatigue: YES- very  Vision or speech changes: YES- peripheral vision blurry.   Ringing in ears (Tinnitus): chronic mild low tone, no change  Hearing Loss: no   No fevers, bit of flushed feeling when it first came on  No headache    History:   Head trauma/concussion hx: no   Previous similar symptoms: no   Recent bleeding history: no     Precipitating factors:   Worse with activity or head movement: YES- \"foggy feeling\"   Any new medications (BP?): no   Alcohol/drug abuse/withdrawal: YES- " "drinks a lot of beer, but less than normal- average 2-3 beer per day, has been having some wine or whiskey on occasion instead.  He's had 2 beers the past day.     Alleviating factors:   Does staying in a fixed position give relief:  YES- but body still weak, tingling and \"jittery\"     Therapies Tried and outcome: none      Problem list and histories reviewed & adjusted, as indicated.  Additional history: as documented    Patient Active Problem List   Diagnosis     Tobacco abuse     Numbness in both legs     Elevated glucose     Alcohol dependence in remission (H)     History reviewed. No pertinent surgical history.    Social History   Substance Use Topics     Smoking status: Current Every Day Smoker     Packs/day: 1.00     Years: 35.00     Types: Cigarettes     Smokeless tobacco: Never Used     Alcohol use 0.0 oz/week     0 Standard drinks or equivalent per week     Family History   Problem Relation Age of Onset     Diabetes Father      Diabetes Paternal Grandfather      Diabetes Paternal Uncle      Diabetes Paternal Uncle          Current Outpatient Prescriptions   Medication Sig Dispense Refill     ASPIRIN PO Take 81 mg by mouth daily       Cholecalciferol (VITAMIN D3 PO) Take 1,000 Units by mouth daily       diclofenac (VOLTAREN) 50 MG EC tablet Take 1 tablet (50 mg) by mouth 3 times daily as needed for moderate pain 60 tablet 1     folic acid-vit B6-vit B12 (FOLGARD) 0.8-10-0.115 MG TABS Take 1 tablet by mouth daily       Multiple Vitamins-Minerals (CENTRUM ULTRA MENS PO) Take 1 tablet by mouth daily        nicotine (NICOTROL) 10 MG inhaler Inhale 1 cartridge into the lungs daily as needed for smoking cessation (Patient not taking: Reported on 4/12/2018) 180 Box 11     Nutritional Supplements (BOOST 100 CALORIE SMART PO)        Thiamine HCl (VITAMIN B-1 PO) Take 100 mg by mouth daily       No Known Allergies    Reviewed and updated as needed this visit by clinical staff  Tobacco  Allergies  Meds  Med Hx  " Surg Hx  Fam Hx  Soc Hx      Reviewed and updated as needed this visit by Provider         ROS:  Constitutional, HEENT, cardiovascular, pulmonary, neuro systems are negative, except as otherwise noted.    OBJECTIVE:     /88 (BP Location: Right arm, Patient Position: Chair, Cuff Size: Adult Regular)  Pulse 67  Temp 97.6  F (36.4  C) (Tympanic)  Resp 14  Wt 166 lb 12.8 oz (75.7 kg)  SpO2 97%  BMI 25.36 kg/m2  Body mass index is 25.36 kg/(m^2).    GENERAL: healthy, alert and no distress  RESP: lungs clear to auscultation - no rales, rhonchi or wheezes  CV: regular rate and rhythm, normal S1 S2, no S3 or S4, no murmur, click or rub, no peripheral edema  NEURO: Cranial nerves intact, normal strength and tone, sensory exam grossly normal, mentation intact, DTR's normal and symmetric at patellas, gait normal including heel/toe/tandem walking and Romberg normal, normal finger to nose and heel to shin tests, negative head thrust and Milroy-Hallpike maneuvers.  No dizziness with sitting back up or standing    Diagnostic Test Results:  Results for orders placed or performed in visit on 11/27/18 (from the past 24 hour(s))   Comprehensive metabolic panel   Result Value Ref Range    Sodium 140 133 - 144 mmol/L    Potassium 4.4 3.4 - 5.3 mmol/L    Chloride 107 94 - 109 mmol/L    Carbon Dioxide 29 20 - 32 mmol/L    Anion Gap 4 3 - 14 mmol/L    Glucose 107 (H) 70 - 99 mg/dL    Urea Nitrogen 11 7 - 30 mg/dL    Creatinine 1.10 0.66 - 1.25 mg/dL    GFR Estimate 70 >60 mL/min/1.7m2    GFR Estimate If Black 85 >60 mL/min/1.7m2    Calcium 8.9 8.5 - 10.1 mg/dL    Bilirubin Total 0.4 0.2 - 1.3 mg/dL    Albumin 3.9 3.4 - 5.0 g/dL    Protein Total 7.8 6.8 - 8.8 g/dL    Alkaline Phosphatase 90 40 - 150 U/L    ALT 40 0 - 70 U/L    AST 21 0 - 45 U/L   CBC with platelets   Result Value Ref Range    WBC 6.7 4.0 - 11.0 10e9/L    RBC Count 4.74 4.4 - 5.9 10e12/L    Hemoglobin 16.0 13.3 - 17.7 g/dL    Hematocrit 47.7 40.0 - 53.0 %    MCV  101 (H) 78 - 100 fl    MCH 33.8 (H) 26.5 - 33.0 pg    MCHC 33.5 31.5 - 36.5 g/dL    RDW 12.3 10.0 - 15.0 %    Platelet Count 209 150 - 450 10e9/L   Hemoglobin A1c   Result Value Ref Range    Hemoglobin A1C 5.9 (H) 0 - 5.6 %   TSH with free T4 reflex   Result Value Ref Range    TSH 1.78 0.40 - 4.00 mU/L   CK total   Result Value Ref Range    CK Total 228 30 - 300 U/L   Magnesium   Result Value Ref Range    Magnesium 2.4 (H) 1.6 - 2.3 mg/dL   Troponin I   Result Value Ref Range    Troponin I ES <0.015 0.000 - 0.045 ug/L       ASSESSMENT/PLAN:         1. Dizziness  2. Generalized muscle weakness    Mark presents with acute onset dizziness and generalized weakness that started this morning of unclear etiology.  Mikie-Hallpike and head thrust tests were normal, so less likely BPPV or neuritis.  Neuro exam is overall normal, so less concerning for a central neuro process.  Symptoms not associated with sitting or standing up, so unlikely orthostatic.  No palpitations or chest pain associated, normal troponin (though checked shortly after episode so could be false negative) and normal EKG, so less likely cardiac.  No pulmonary symptoms.  Lab worked check below fairly unremarkable, he remains in pre-diabetic range.  We did discuss head CT for further work-up, but with normal exam we decided not to get this right away, but will observe for a bit first.  Get CT if not improved in a week or new symptoms develop.  Will try meclizine for symptoms.     - Comprehensive metabolic panel  - CBC with platelets  - Hemoglobin A1c  - TSH with free T4 reflex  - CK total  - Magnesium  - Troponin I  - EKG 12-lead complete w/read - Clinics      Jose Antonio Lao MD  [facility]

## 2018-11-27 NOTE — TELEPHONE ENCOUNTER
Chantix starter and continuing prescriptions sent to pharmacy.  Should start med one week before quit date.  Thanks.    Jose Antonio Lao MD

## 2018-11-30 ENCOUNTER — OFFICE VISIT (OUTPATIENT)
Dept: FAMILY MEDICINE | Facility: CLINIC | Age: 53
End: 2018-11-30
Payer: COMMERCIAL

## 2018-11-30 VITALS
RESPIRATION RATE: 14 BRPM | WEIGHT: 162.2 LBS | HEART RATE: 87 BPM | TEMPERATURE: 98 F | BODY MASS INDEX: 24.66 KG/M2 | OXYGEN SATURATION: 98 % | SYSTOLIC BLOOD PRESSURE: 136 MMHG | DIASTOLIC BLOOD PRESSURE: 84 MMHG

## 2018-11-30 DIAGNOSIS — Z12.12 SCREENING FOR COLORECTAL CANCER: ICD-10-CM

## 2018-11-30 DIAGNOSIS — Z12.11 SCREENING FOR COLORECTAL CANCER: ICD-10-CM

## 2018-11-30 DIAGNOSIS — R42 DIZZINESS: Primary | ICD-10-CM

## 2018-11-30 PROCEDURE — 99213 OFFICE O/P EST LOW 20 MIN: CPT | Performed by: INTERNAL MEDICINE

## 2018-11-30 NOTE — MR AVS SNAPSHOT
After Visit Summary   11/30/2018    Mark Nathan    MRN: [id number]           Patient Information     Date Of Birth          1965        Visit Information        Provider Department      11/30/2018 10:00 AM Jose Antonio Lao MD [facility]        Today's Diagnoses     Screening for colorectal cancer    -  1      Care Instructions    Take the meclizine twice per day today, then once tomorrow.  If symptoms return, let me know and we will get the head CT scan.           Follow-ups after your visit        Future tests that were ordered for you today     Open Future Orders        Priority Expected Expires Ordered    Fecal colorectal cancer screen (FIT) Routine 12/21/2018 2/22/2019 11/30/2018            Who to contact     If you have questions or need follow up information about today's clinic visit or your schedule please contact [facility] directly at [phone].  Normal or non-critical lab and imaging results will be communicated to you by RewardLoophart, letter or phone within 4 business days after the clinic has received the results. If you do not hear from us within 7 days, please contact the clinic through [employer]Lo[employer] or phone. If you have a critical or abnormal lab result, we will notify you by phone as soon as possible.  Submit refill requests through [employer]oobi or call your pharmacy and they will forward the refill request to us. Please allow 3 business days for your refill to be completed.          Additional Information About Your Visit        MyChart Information     [employer] gives you secure access to your electronic health record. If you see a primary care provider, you can also send messages to your care team and make appointments. If you have questions, please call your primary care clinic.  If you do not have a primary care provider, please call [phone] and they will assist you.        Care EveryWhere ID     This is your Care EveryWhere ID. This could be used by  other organizations to access your [facility] medical records  [id number]        Your Vitals Were     Pulse Temperature Respirations Pulse Oximetry BMI (Body Mass Index)       87 98  F (36.7  C) (Tympanic) 14 98% 24.66 kg/m2        Blood Pressure from Last 3 Encounters:   11/30/18 136/84   11/27/18 126/88   04/12/18 (!) 142/98    Weight from Last 3 Encounters:   11/30/18 162 lb 3.2 oz (73.6 kg)   11/27/18 166 lb 12.8 oz (75.7 kg)   04/12/18 163 lb 1.6 oz (74 kg)               Primary Care Provider Office Phone # Fax #    MayuriJamila Lao [phone] [address] [facility] [address]        Equal Access to Services     DILIP HINES : Hadii aad ku hadasho Soomaali, waaxda luqadaha, qaybta kaalmada adekeciayaagusto, tavon ba . So [facility] [phone].    ATENCIÓN: Si habla español, tiene a mendes disposición servicios gratuitos de asistencia lingüística. Michelle al [phone].    We comply with applicable federal civil rights laws and [place] laws. We do not discriminate on the basis of race, color, national origin, age, disability, sex, sexual orientation, or gender identity.            Thank you!     Thank you for choosing [facility]  for your care. Our goal is always to provide you with excellent care. Hearing back from our patients is one way we can continue to improve our services. Please take a few minutes to complete the written survey that you may receive in the mail after your visit with us. Thank you!             Your Updated Medication List - Protect others around you: Learn how to safely use, store and throw away your medicines at www.disposemymeds.org.          This list is accurate as of 11/30/18 10:26 AM.  Always use your most recent med list.                   Brand Name Dispense Instructions for use Diagnosis    ASPIRIN PO      Take 81 mg by mouth daily        BOOST 100 CALORIE SMART PO           CENTRUM ULTRA MENS PO      Take 1 tablet by mouth  daily        diclofenac 50 MG EC tablet    VOLTAREN    60 tablet    Take 1 tablet (50 mg) by mouth 3 times daily as needed for moderate pain    Sprain of left ankle, unspecified ligament, subsequent encounter       folic acid-vit B6-vit B12 0.8-10-0.115 MG Tabs per tablet    FOLGARD     Take 1 tablet by mouth daily        meclizine 25 MG tablet    ANTIVERT    30 tablet    Take 1 tablet (25 mg) by mouth every 6 hours as needed for dizziness    Dizziness       nicotine 10 MG inhaler    NICOTROL    180 Box    Inhale 1 cartridge into the lungs daily as needed for smoking cessation    Tobacco abuse       * varenicline 0.5 MG X 11 & 1 MG X 42 tablet    CHANTIX STARTING MONTH PINKY    53 tablet    Take 0.5 mg tab daily for 3 days, then 0.5 mg tab twice daily for 4 days, then 1 mg twice daily.    Tobacco abuse       * varenicline 1 MG tablet   Start taking on:  12/27/2018    CHANTIX    56 tablet    Take 1 tablet (1 mg) by mouth 2 times daily    Tobacco abuse       VITAMIN B-1 PO      Take 100 mg by mouth daily        VITAMIN D3 PO      Take 1,000 Units by mouth daily        * Notice:  This list has 2 medication(s) that are the same as other medications prescribed for you. Read the directions carefully, and ask your doctor or other care provider to review them with you.

## 2018-11-30 NOTE — PROGRESS NOTES
"  SUBJECTIVE:   Mark Nathan is a 53 year old male who presents to clinic today for the following health issues:    Chief Complaint   Patient presents with     RECHECK     follow up dizziness, wondering if ok to return to work on Monday     Recheck Medication     meclizine, continue?      Imm/Inj     flu vaccine deferred     Health Maintenance     colonoscopy deferred, FIT test ordered and given to patient        I saw Mark 3 days ago for acute onset dizziness and tunnel vision type symptoms.  Lab workup was normal and we did not pursue head imaging at that point since his neurologic exam was normal.  We will start meclizine to see if that would help with symptoms.      Medication Followup of Meclizine     Taking Medication as prescribed: yes    Side Effects:  None    Medication Helping Symptoms:  yes     Mark's dizziness did seems a lot better yesterday and this morning, but then had the symptoms below.  Dizziness has not returned, but vision was a little off, felt like pressure behind the eye.  No headache with the episode below, but he did have a headache yesterday and that resolved with aspirin.       Dizziness - head pressure       Duration: today     Description (location/character/radiation): PATIENT reports he put on his hat this morning and felt tingling in head on right side near the temple - describes the sensation as \"little electric flashes\", took the hat off and felt fine.          Therapies tried and outcome: removed hat       Problem list and histories reviewed & adjusted, as indicated.  Additional history: as documented    Patient Active Problem List   Diagnosis     Tobacco abuse     Numbness in both legs     Elevated glucose     Alcohol dependence in remission (H)     History reviewed. No pertinent surgical history.    Social History   Substance Use Topics     Smoking status: Current Every Day Smoker     Packs/day: 1.00     Years: 35.00     Types: Cigarettes     Smokeless tobacco: Never " Used     Alcohol use 0.0 oz/week     0 Standard drinks or equivalent per week     Family History   Problem Relation Age of Onset     Diabetes Father      Parkinsonism Paternal Grandmother      Alzheimer Disease Paternal Grandmother      Diabetes Paternal Grandfather      Diabetes Paternal Uncle      Diabetes Paternal Uncle          Current Outpatient Prescriptions   Medication Sig Dispense Refill     ASPIRIN PO Take 81 mg by mouth daily       Cholecalciferol (VITAMIN D3 PO) Take 1,000 Units by mouth daily       folic acid-vit B6-vit B12 (FOLGARD) 0.8-10-0.115 MG TABS Take 1 tablet by mouth daily       meclizine (ANTIVERT) 25 MG tablet Take 1 tablet (25 mg) by mouth every 6 hours as needed for dizziness 30 tablet 1     Multiple Vitamins-Minerals (CENTRUM ULTRA MENS PO) Take 1 tablet by mouth daily        Thiamine HCl (VITAMIN B-1 PO) Take 100 mg by mouth daily       diclofenac (VOLTAREN) 50 MG EC tablet Take 1 tablet (50 mg) by mouth 3 times daily as needed for moderate pain 60 tablet 1     nicotine (NICOTROL) 10 MG inhaler Inhale 1 cartridge into the lungs daily as needed for smoking cessation (Patient not taking: Reported on 4/12/2018) 180 Box 11     Nutritional Supplements (BOOST 100 CALORIE SMART PO)        varenicline (CHANTIX STARTING MONTH PINKY) 0.5 MG X 11 & 1 MG X 42 tablet Take 0.5 mg tab daily for 3 days, then 0.5 mg tab twice daily for 4 days, then 1 mg twice daily. 53 tablet 0     [START ON 12/27/2018] varenicline (CHANTIX) 1 MG tablet Take 1 tablet (1 mg) by mouth 2 times daily 56 tablet 2     No Known Allergies    Reviewed and updated as needed this visit by clinical staff  Tobacco  Allergies  Meds  Med Hx  Surg Hx  Fam Hx  Soc Hx      Reviewed and updated as needed this visit by Provider         ROS:  Constitutional, neuro systems are negative, except as otherwise noted.    OBJECTIVE:     /84 (BP Location: Right arm, Patient Position: Chair, Cuff Size: Adult Regular)  Pulse 87  Temp 98  F  (36.7  C) (Tympanic)  Resp 14  Wt 162 lb 3.2 oz (73.6 kg)  SpO2 98%  BMI 24.66 kg/m2  Body mass index is 24.66 kg/(m^2).  GENERAL: healthy, alert and no distress  HENT: No scalp abnormalities noted in the area of the funny sensations she experienced earlier today, normal temporal artery pulses      ASSESSMENT/PLAN:       1. Dizziness    Mark symptoms of dizziness is greatly improved since 3 days ago.  Unclear if this is meclizine helping or if this is just natural improvement.  He did have odd paresthesias in the right scalp earlier today when he put a hat on.  Suspect had may have been pressing on nerve.  He does report that the head is new and somewhat tight.  Temporal artery in that area is normal he did not have any associated headache, so low suspicion for GCA.  We will have him taper off of the meclizine.  If symptoms return, would recommend further workup with head CT scan.  If symptoms return and CT is normal, could consider trial of prednisone to see if this may be neuritis, though head thrust test was negative.    2. Screening for colorectal cancer    - Fecal colorectal cancer screen (FIT); Future      Jose Antonio Lao MD  [facility]

## 2018-11-30 NOTE — PATIENT INSTRUCTIONS
Take the meclizine twice per day today, then once tomorrow.  If symptoms return, let me know and we will get the head CT scan.

## 2018-11-30 NOTE — LETTER
[facility]  [address] [address]  Phone: [phone]    November 30, 2018        Mark Nathan  [address] [facility] [address]          To whom it may concern:    RE: Mark Nathan    Patient was seen and treated today at our clinic.  He can return to work on Monday, December 3rd, 2018.     Please contact me for questions or concerns.      Sincerely,        Jose Antonio Lao MD

## 2018-12-16 PROCEDURE — 82274 ASSAY TEST FOR BLOOD FECAL: CPT | Performed by: INTERNAL MEDICINE

## 2018-12-23 LAB — HEMOCCULT STL QL IA: NEGATIVE

## 2018-12-24 DIAGNOSIS — Z12.12 SCREENING FOR COLORECTAL CANCER: ICD-10-CM

## 2018-12-24 DIAGNOSIS — Z12.11 SCREENING FOR COLORECTAL CANCER: ICD-10-CM

## 2019-06-04 ENCOUNTER — TRANSFERRED RECORDS (OUTPATIENT)
Dept: HEALTH INFORMATION MANAGEMENT | Facility: CLINIC | Age: 54
End: 2019-06-04

## 2019-06-17 ENCOUNTER — OFFICE VISIT (OUTPATIENT)
Dept: FAMILY MEDICINE | Facility: CLINIC | Age: 54
End: 2019-06-17
Payer: OTHER MISCELLANEOUS

## 2019-06-17 VITALS
TEMPERATURE: 98.8 F | OXYGEN SATURATION: 98 % | BODY MASS INDEX: 24.71 KG/M2 | DIASTOLIC BLOOD PRESSURE: 82 MMHG | RESPIRATION RATE: 18 BRPM | SYSTOLIC BLOOD PRESSURE: 136 MMHG | WEIGHT: 163 LBS | HEIGHT: 68 IN | HEART RATE: 80 BPM

## 2019-06-17 DIAGNOSIS — Z01.818 PREOP GENERAL PHYSICAL EXAM: Primary | ICD-10-CM

## 2019-06-17 DIAGNOSIS — S69.92XS INJURY OF LEFT THUMB, SEQUELA: ICD-10-CM

## 2019-06-17 DIAGNOSIS — R73.03 PRE-DIABETES: ICD-10-CM

## 2019-06-17 LAB — HBA1C MFR BLD: 5.8 % (ref 0–5.6)

## 2019-06-17 PROCEDURE — 99214 OFFICE O/P EST MOD 30 MIN: CPT | Performed by: INTERNAL MEDICINE

## 2019-06-17 PROCEDURE — 83036 HEMOGLOBIN GLYCOSYLATED A1C: CPT | Performed by: INTERNAL MEDICINE

## 2019-06-17 PROCEDURE — 36415 COLL VENOUS BLD VENIPUNCTURE: CPT | Performed by: INTERNAL MEDICINE

## 2019-06-17 ASSESSMENT — MIFFLIN-ST. JEOR: SCORE: 1554.89

## 2019-06-17 NOTE — PROGRESS NOTES
[facility]  [address] [facility] [address]  [phone]  Dept: [phone]    PRE-OP EVALUATION:  Today's date: 2019    Mark Nathan (: 1965) presents for pre-operative evaluation assessment as requested by Dr. Ferguson.  He requires evaluation and anesthesia risk assessment prior to undergoing surgery/procedure for treatment of explorer repair/ digital nerve left thumb .    Proposed Surgery/ Procedure: explorer repair/ digital nerve left thumb.  Date of Surgery/ Procedure: 19  Time of Surgery/ Procedure: 08:00am  Hospital/Surgical Facility: [facility]   Fax number for surgical facility: care team [phone], surgery center [phone]  Primary Physician: Jose Antonio Lao  Type of Anesthesia Anticipated: to be determined    Patient has a Health Care Directive or Living Will:  NO    1. NO - Do you have a history of heart attack, stroke, stent, bypass or surgery on an artery in the head, neck, heart or legs?  2. NO - Do you ever have any pain or discomfort in your chest?  3. NO - Do you have a history of  Heart Failure?  4. NO - Are you troubled by shortness of breath when: walking on the level, up a slight hill or at night?  5. NO - Do you currently have a cold, bronchitis or other respiratory infection?  6. NO - Do you have a cough, shortness of breath or wheezing?  7. NO - Do you sometimes get pains in the calves of your legs when you walk?  8. NO - Do you or anyone in your family have previous history of blood clots?  9. NO - Do you or does anyone in your family have a serious bleeding problem such as prolonged bleeding following surgeries or cuts?  10. NO - Have you ever had problems with anemia or been told to take iron pills?  11. NO - Have you had any abnormal blood loss such as black, tarry or bloody stools, or abnormal vaginal bleeding?  12. NO - Have you ever had a blood transfusion?  13. NO - Have you or any of your  relatives ever had problems with anesthesia?  14. Yes, snoring - Do you have sleep apnea, excessive snoring or daytime drowsiness?  No history of apnea, snores per his wife.  No daytime fatigue.  He reports he sleeps well  15. NO - Do you have any prosthetic heart valves?  16. NO - Do you have prosthetic joints?  17. NO - Is there any chance that you may be pregnant?      HPI:     HPI related to upcoming procedure:     Mark had a thumb injury/laceration 3-4 weeks ago at work and had sutures placed by the doctor through his work place, after that he had numbness in the thumb and was discovered to have a nerve injury.  Surgical repair of the nerve is planned.   He has been holding aspirin since last Friday.        See problem list for active medical problems.  Problems all longstanding and stable, except as noted/documented.  See ROS for pertinent symptoms related to these conditions.      MEDICAL HISTORY:     Patient Active Problem List    Diagnosis Date Noted     Elevated glucose 03/21/2016     Priority: Medium     Alcohol dependence in remission (H) 03/21/2016     Priority: Medium     Tobacco abuse 03/17/2016     Priority: Medium     Numbness in both legs 03/17/2016     Priority: Medium      Past Medical History:   Diagnosis Date     Alcohol dependence in remission (H) 3/21/2016     No past surgical history on file.  Current Outpatient Medications   Medication Sig Dispense Refill     ASPIRIN PO Take 81 mg by mouth daily       Cholecalciferol (VITAMIN D3 PO) Take 1,000 Units by mouth daily       diclofenac (VOLTAREN) 50 MG EC tablet Take 1 tablet (50 mg) by mouth 3 times daily as needed for moderate pain 60 tablet 1     folic acid-vit B6-vit B12 (FOLGARD) 0.8-10-0.115 MG TABS Take 1 tablet by mouth daily       meclizine (ANTIVERT) 25 MG tablet Take 1 tablet (25 mg) by mouth every 6 hours as needed for dizziness 30 tablet 1     Multiple Vitamins-Minerals (CENTRUM ULTRA MENS PO) Take 1 tablet by mouth daily         nicotine (NICOTROL) 10 MG inhaler Inhale 1 cartridge into the lungs daily as needed for smoking cessation (Patient not taking: Reported on 4/12/2018) 180 Box 11     Nutritional Supplements (BOOST 100 CALORIE SMART PO)        Thiamine HCl (VITAMIN B-1 PO) Take 100 mg by mouth daily       varenicline (CHANTIX STARTING MONTH PINKY) 0.5 MG X 11 & 1 MG X 42 tablet Take 0.5 mg tab daily for 3 days, then 0.5 mg tab twice daily for 4 days, then 1 mg twice daily. 53 tablet 0     varenicline (CHANTIX) 1 MG tablet Take 1 tablet (1 mg) by mouth 2 times daily 56 tablet 2     OTC products: None, except as noted above    No Known Allergies   Latex Allergy: NO    Social History     Tobacco Use     Smoking status: Current Every Day Smoker     Packs/day: 1.00     Years: 35.00     Pack years: 35.00     Types: Cigarettes     Smokeless tobacco: Never Used   Substance Use Topics     Alcohol use: Yes     Alcohol/week: 0.0 oz     History   Drug Use No       REVIEW OF SYSTEMS:   Constitutional, neuro, ENT, endocrine, pulmonary, cardiac, gastrointestinal, genitourinary, musculoskeletal, integument and psychiatric systems are negative, except as otherwise noted.    EXAM:   There were no vitals taken for this visit.      GENERAL APPEARANCE: healthy, alert and no distress     HENT: normal ear canals and TMs, nose and mouth without ulcers or lesions     NECK: no adenopathy, no asymmetry, masses, or scars     RESP: lungs clear to auscultation - no rales, rhonchi or wheezes     CV: regular rates and rhythm, normal S1 S2, no S3 or S4 and no murmur, click or rub -     ABDOMEN:  soft, nontender, no HSM or masses and bowel sounds normal     MS: extremities normal- no gross deformities noted, no evidence of inflammation in joints, scarring at base of left thumb from prior laceration/repair       NEURO: mentation intact and speech normal     PSYCH: mentation appears normal. and affect normal/bright     LYMPHATICS: No cervical or supraclavicular  nodes  DERM: small first degree burn on RLQ abdomen    DIAGNOSTICS:     Labs Resulted Today:   Results for orders placed or performed in visit on 06/17/19   Hemoglobin A1c   Result Value Ref Range    Hemoglobin A1C 5.8 (H) 0 - 5.6 %       Recent Labs   Lab Test 11/27/18  1035 06/07/17  0733 06/06/17  2358   HGB 16.0  --  16.0     --  221     --  137   POTASSIUM 4.4  --  3.6   CR 1.10  --  1.13   A1C 5.9* 5.7  --         IMPRESSION:   Reason for surgery/procedure: Repair of nerve in left thumb  Diagnosis/reason for consult: Pre-op eval    The proposed surgical procedure is considered INTERMEDIATE risk.    REVISED CARDIAC RISK INDEX  The patient has the following serious cardiovascular risks for perioperative complications such as (MI, PE, VFib and 3  AV Block):  No serious cardiac risks  INTERPRETATION: 0 risks: Class I (very low risk - 0.4% complication rate)    The patient has the following additional risks for perioperative complications:  No identified additional risks      ICD-10-CM    1. Preop general physical exam Z01.818 Hemoglobin A1c   2. Injury of left thumb, sequela S69.92XS    3. Pre-diabetes R73.03 Hemoglobin A1c       RECOMMENDATIONS:       Cardiovascular Risk  Performs 4 METs exercise without symptoms (Climb a flight of stairs) .   EKG current within last year showed sinus juanita but otherwise normal  He was advised to quit smoking zehra-operatively    --Patient is to take all scheduled medications on the day of surgery EXCEPT for modifications listed below:    Hold aspirin for one week.     APPROVAL GIVEN to proceed with proposed procedure, without further diagnostic evaluation       Signed Electronically by: Jose Antonio Lao MD    Copy of this evaluation report is provided to requesting physician.    [facility] Preop Guidelines    Revised Cardiac Risk Index

## 2019-06-17 NOTE — PATIENT INSTRUCTIONS
Avoid ibuprofen for 1 day prior to surgery, avoid naproxen and diclofenac for 3 days prior, and avoid products containing aspirin for 1 week before surgery.  Tylenol is okay to take for pain if needed.    Before Your Surgery      Call your surgeon if there is any change in your health. This includes signs of a cold or flu (such as a sore throat, runny nose, cough, rash or fever).    Do not smoke, drink alcohol or take over the counter medicine (unless your surgeon or primary care doctor tells you to) for the 24 hours before and after surgery.    If you take prescribed drugs: Follow your doctor s orders about which medicines to take and which to stop until after surgery.    Eating and drinking prior to surgery: follow the instructions from your surgeon    Take a shower or bath the night before surgery. Use the soap your surgeon gave you to gently clean your skin. If you do not have soap from your surgeon, use your regular soap. Do not shave or scrub the surgery site.  Wear clean pajamas and have clean sheets on your bed.

## 2019-06-20 ENCOUNTER — TRANSFERRED RECORDS (OUTPATIENT)
Dept: HEALTH INFORMATION MANAGEMENT | Facility: CLINIC | Age: 54
End: 2019-06-20

## 2019-06-26 ENCOUNTER — TRANSFERRED RECORDS (OUTPATIENT)
Dept: HEALTH INFORMATION MANAGEMENT | Facility: CLINIC | Age: 54
End: 2019-06-26

## 2019-07-03 ENCOUNTER — TRANSFERRED RECORDS (OUTPATIENT)
Dept: HEALTH INFORMATION MANAGEMENT | Facility: CLINIC | Age: 54
End: 2019-07-03

## 2019-07-22 ENCOUNTER — TRANSFERRED RECORDS (OUTPATIENT)
Dept: HEALTH INFORMATION MANAGEMENT | Facility: CLINIC | Age: 54
End: 2019-07-22

## 2019-09-23 ENCOUNTER — TRANSFERRED RECORDS (OUTPATIENT)
Dept: HEALTH INFORMATION MANAGEMENT | Facility: CLINIC | Age: 54
End: 2019-09-23

## 2019-10-10 ENCOUNTER — OFFICE VISIT (OUTPATIENT)
Dept: FAMILY MEDICINE | Facility: CLINIC | Age: 54
End: 2019-10-10
Payer: COMMERCIAL

## 2019-10-10 ENCOUNTER — ANCILLARY PROCEDURE (OUTPATIENT)
Dept: GENERAL RADIOLOGY | Facility: CLINIC | Age: 54
End: 2019-10-10
Attending: INTERNAL MEDICINE
Payer: COMMERCIAL

## 2019-10-10 VITALS
WEIGHT: 156.6 LBS | HEART RATE: 75 BPM | TEMPERATURE: 97.8 F | DIASTOLIC BLOOD PRESSURE: 80 MMHG | SYSTOLIC BLOOD PRESSURE: 132 MMHG | RESPIRATION RATE: 14 BRPM | OXYGEN SATURATION: 98 % | BODY MASS INDEX: 23.99 KG/M2

## 2019-10-10 DIAGNOSIS — R25.2 MUSCLE CRAMP: Primary | ICD-10-CM

## 2019-10-10 DIAGNOSIS — R05.9 COUGH: ICD-10-CM

## 2019-10-10 DIAGNOSIS — R63.4 WEIGHT LOSS: ICD-10-CM

## 2019-10-10 DIAGNOSIS — Z13.220 LIPID SCREENING: ICD-10-CM

## 2019-10-10 DIAGNOSIS — R63.0 LOSS OF APPETITE: ICD-10-CM

## 2019-10-10 DIAGNOSIS — G62.9 PERIPHERAL POLYNEUROPATHY: ICD-10-CM

## 2019-10-10 LAB
ALBUMIN SERPL-MCNC: 4.2 G/DL (ref 3.4–5)
ALP SERPL-CCNC: 88 U/L (ref 40–150)
ALT SERPL W P-5'-P-CCNC: 40 U/L (ref 0–70)
ANION GAP SERPL CALCULATED.3IONS-SCNC: 6 MMOL/L (ref 3–14)
AST SERPL W P-5'-P-CCNC: 27 U/L (ref 0–45)
BILIRUB SERPL-MCNC: 0.6 MG/DL (ref 0.2–1.3)
BUN SERPL-MCNC: 15 MG/DL (ref 7–30)
CALCIUM SERPL-MCNC: 9.7 MG/DL (ref 8.5–10.1)
CHLORIDE SERPL-SCNC: 107 MMOL/L (ref 94–109)
CHOLEST SERPL-MCNC: 190 MG/DL
CK SERPL-CCNC: 309 U/L (ref 30–300)
CO2 SERPL-SCNC: 28 MMOL/L (ref 20–32)
CREAT SERPL-MCNC: 1.09 MG/DL (ref 0.66–1.25)
ERYTHROCYTE [DISTWIDTH] IN BLOOD BY AUTOMATED COUNT: 11.9 % (ref 10–15)
GFR SERPL CREATININE-BSD FRML MDRD: 77 ML/MIN/{1.73_M2}
GLUCOSE SERPL-MCNC: 101 MG/DL (ref 70–99)
HCT VFR BLD AUTO: 50.2 % (ref 40–53)
HDLC SERPL-MCNC: 43 MG/DL
HGB BLD-MCNC: 16.9 G/DL (ref 13.3–17.7)
LDLC SERPL CALC-MCNC: 125 MG/DL
MAGNESIUM SERPL-MCNC: 2.3 MG/DL (ref 1.6–2.3)
MCH RBC QN AUTO: 33.7 PG (ref 26.5–33)
MCHC RBC AUTO-ENTMCNC: 33.7 G/DL (ref 31.5–36.5)
MCV RBC AUTO: 100 FL (ref 78–100)
NONHDLC SERPL-MCNC: 147 MG/DL
PLATELET # BLD AUTO: 240 10E9/L (ref 150–450)
POTASSIUM SERPL-SCNC: 4.7 MMOL/L (ref 3.4–5.3)
PROT SERPL-MCNC: 8.7 G/DL (ref 6.8–8.8)
RBC # BLD AUTO: 5.02 10E12/L (ref 4.4–5.9)
SODIUM SERPL-SCNC: 141 MMOL/L (ref 133–144)
TRIGL SERPL-MCNC: 108 MG/DL
TSH SERPL DL<=0.005 MIU/L-ACNC: 1.6 MU/L (ref 0.4–4)
VIT B12 SERPL-MCNC: 611 PG/ML (ref 193–986)
WBC # BLD AUTO: 5.7 10E9/L (ref 4–11)

## 2019-10-10 PROCEDURE — 80053 COMPREHEN METABOLIC PANEL: CPT | Performed by: INTERNAL MEDICINE

## 2019-10-10 PROCEDURE — 36415 COLL VENOUS BLD VENIPUNCTURE: CPT | Performed by: INTERNAL MEDICINE

## 2019-10-10 PROCEDURE — 83735 ASSAY OF MAGNESIUM: CPT | Performed by: INTERNAL MEDICINE

## 2019-10-10 PROCEDURE — 80061 LIPID PANEL: CPT | Performed by: INTERNAL MEDICINE

## 2019-10-10 PROCEDURE — 82607 VITAMIN B-12: CPT | Performed by: INTERNAL MEDICINE

## 2019-10-10 PROCEDURE — 82550 ASSAY OF CK (CPK): CPT | Performed by: INTERNAL MEDICINE

## 2019-10-10 PROCEDURE — 84425 ASSAY OF VITAMIN B-1: CPT | Mod: 90 | Performed by: INTERNAL MEDICINE

## 2019-10-10 PROCEDURE — 85027 COMPLETE CBC AUTOMATED: CPT | Performed by: INTERNAL MEDICINE

## 2019-10-10 PROCEDURE — 71046 X-RAY EXAM CHEST 2 VIEWS: CPT

## 2019-10-10 PROCEDURE — 99000 SPECIMEN HANDLING OFFICE-LAB: CPT | Performed by: INTERNAL MEDICINE

## 2019-10-10 PROCEDURE — 84443 ASSAY THYROID STIM HORMONE: CPT | Performed by: INTERNAL MEDICINE

## 2019-10-10 PROCEDURE — 99214 OFFICE O/P EST MOD 30 MIN: CPT | Performed by: INTERNAL MEDICINE

## 2019-10-10 NOTE — PROGRESS NOTES
Subjective     Mark Nathan is a 53 year old male who presents to clinic today for the following health issues:  Chief Complaint   Patient presents with     Leg Pain     x 3 weeks, bilateral leg cramping started at night, now happening during the day.        HPI   Leg cramping - bilateral       Duration: x 3 weeks.      Description (location/character/radiation): PATIENT reports bilateral leg cramping that started while sleeping, now patient is experiencing symptoms during the day while working, driving etc.  Cramps last for a few minutes.  Patient also reports having some tingling/vibration on the top of the right foot up to ankle (not in the toes) when he is moving the foot.  Has some numbness on the dorsum of the left foot but not tingling/vibration.      Intensity:  moderate    Accompanying signs and symptoms: PATIENT also reports having hand cramping at work.  No aching in the calves with ambulation.  No edema.      History (similar episodes/previous evaluation): None    Precipitating or alleviating factors:  Appetite has been down, so he has only been eating 1 meal per day.  Has lost 10 pounds.  No stomach pain, nausea, trouble with BMs.  Having increased cough since decreasing smoking, this is dry, no blood.      Therapies tried and outcome: added magnesium a few days ago and has not noticed any improvement yet.  Has tried improving his diet in general as well.      Feels like he is staying well hydrated.  Has cut down his smoking from 2ppd to 0.5ppd (Chantix didn't help, Wellbutrin didn't help).  Stopped drinking.  One cup of coffee in the morning, small cup and often doesn't finish it.  Cut down on soda and switched to water.         Patient Active Problem List   Diagnosis     Tobacco abuse     Numbness in both legs     Elevated glucose     Alcohol dependence in remission (H)     History reviewed. No pertinent surgical history.    Social History     Tobacco Use     Smoking status: Current Every Day  Smoker     Packs/day: 0.50     Years: 35.00     Pack years: 17.50     Types: Cigarettes     Smokeless tobacco: Never Used   Substance Use Topics     Alcohol use: Not Currently     Alcohol/week: 0.0 standard drinks     Comment: occasional     Family History   Problem Relation Age of Onset     Diabetes Father      Parkinsonism Paternal Grandmother      Alzheimer Disease Paternal Grandmother      Diabetes Paternal Grandfather      Diabetes Paternal Uncle      Diabetes Paternal Uncle          Current Outpatient Medications   Medication Sig Dispense Refill     ASPIRIN PO Take 81 mg by mouth daily       Multiple Vitamins-Minerals (CENTRUM ULTRA MENS PO) Take 1 tablet by mouth daily        Cholecalciferol (VITAMIN D3 PO) Take 1,000 Units by mouth daily       folic acid-vit B6-vit B12 (FOLGARD) 0.8-10-0.115 MG TABS Take 1 tablet by mouth daily       Nutritional Supplements (BOOST 100 CALORIE SMART PO)        Thiamine HCl (VITAMIN B-1 PO) Take 100 mg by mouth daily       No Known Allergies    Reviewed and updated as needed this visit by Provider         Review of Systems   ROS COMP: Constitutional, pulm, GI, MSK systems are negative, except as otherwise noted.      Objective    /80 (BP Location: Right arm, Patient Position: Sitting, Cuff Size: Adult Regular)   Pulse 75   Temp 97.8  F (36.6  C) (Tympanic)   Resp 14   Wt 71 kg (156 lb 9.6 oz)   SpO2 98%   BMI 23.99 kg/m    Body mass index is 23.99 kg/m .  Physical Exam   GENERAL: healthy, alert and no distress  NECK: no adenopathy, no asymmetry, masses, or scars and thyroid normal to palpation  RESP: lungs clear to auscultation - no rales, rhonchi or wheezes  CV: regular rate and rhythm, normal S1 S2, no S3 or S4, no murmur, click or rub, no peripheral edema  MS: no gross musculoskeletal defects noted, no edema, no pain to palpation of calves    Diagnostic Test Results:  Labs reviewed in Epic  Results for orders placed or performed in visit on 10/10/19 (from the  past 24 hour(s))   Magnesium   Result Value Ref Range    Magnesium 2.3 1.6 - 2.3 mg/dL   TSH with free T4 reflex   Result Value Ref Range    TSH 1.60 0.40 - 4.00 mU/L   CK total   Result Value Ref Range    CK Total 309 (H) 30 - 300 U/L   Comprehensive metabolic panel   Result Value Ref Range    Sodium 141 133 - 144 mmol/L    Potassium 4.7 3.4 - 5.3 mmol/L    Chloride 107 94 - 109 mmol/L    Carbon Dioxide 28 20 - 32 mmol/L    Anion Gap 6 3 - 14 mmol/L    Glucose 101 (H) 70 - 99 mg/dL    Urea Nitrogen 15 7 - 30 mg/dL    Creatinine 1.09 0.66 - 1.25 mg/dL    GFR Estimate 77 >60 mL/min/[1.73_m2]    GFR Estimate If Black 89 >60 mL/min/[1.73_m2]    Calcium 9.7 8.5 - 10.1 mg/dL    Bilirubin Total 0.6 0.2 - 1.3 mg/dL    Albumin 4.2 3.4 - 5.0 g/dL    Protein Total 8.7 6.8 - 8.8 g/dL    Alkaline Phosphatase 88 40 - 150 U/L    ALT 40 0 - 70 U/L    AST 27 0 - 45 U/L   CBC with platelets   Result Value Ref Range    WBC 5.7 4.0 - 11.0 10e9/L    RBC Count 5.02 4.4 - 5.9 10e12/L    Hemoglobin 16.9 13.3 - 17.7 g/dL    Hematocrit 50.2 40.0 - 53.0 %     78 - 100 fl    MCH 33.7 (H) 26.5 - 33.0 pg    MCHC 33.7 31.5 - 36.5 g/dL    RDW 11.9 10.0 - 15.0 %    Platelet Count 240 150 - 450 10e9/L   Lipid panel reflex to direct LDL Non-fasting   Result Value Ref Range    Cholesterol 190 <200 mg/dL    Triglycerides 108 <150 mg/dL    HDL Cholesterol 43 >39 mg/dL    LDL Cholesterol Calculated 125 (H) <100 mg/dL    Non HDL Cholesterol 147 (H) <130 mg/dL           Assessment & Plan     1. Muscle cramp  and  2. Loss of appetite    Mark presents with symptoms of muscle cramps in the hands and legs both at night and during the day while working.  He has tried stretching, increasing hydration, Mg supplement, and trying to eat healthier without improvement.  Labs as below significant for only very slightly elevated CK, doubt this is of significance.  Will have him try a B complex vitamin to see if that helps with symptoms.  Work note provided  for the next week since he doesn't feel safe at work with the sudden muscle cramps.  He had normal ABIs 3 years ago and symptoms do not sounds like claudication.     - Magnesium  - TSH with free T4 reflex  - CK total  - Comprehensive metabolic panel  - CBC with platelets    3. Cough  and  4. Weight loss    He's noticed increased cough and has had weight loss.  Given his smoking history, I wanted to check a CXR, but this appears clear to me.  Increased cough may be a result of decreasing his smoking as cilia may be more active now.  He is not up to date on colon cancer screening- will advise him to pursue this.      - XR Chest 2 Views    5. Lipid screening    Intermediate risk score, statin optional for risk reduction but would hold on this until cramping issue is resolved.     The 10-year ASCVD risk score (Sinnamahoningbrigido PRITCHARD Jr., et al., 2013) is: 10.5%    Values used to calculate the score:      Age: 53 years      Sex: Male      Is Non- : No      Diabetic: No      Tobacco smoker: Yes      Systolic Blood Pressure: 132 mmHg      Is BP treated: No      HDL Cholesterol: 43 mg/dL      Total Cholesterol: 190 mg/dL     - Lipid panel reflex to direct LDL Non-fasting    6. Peripheral polyneuropathy    Likely related to history of heavy alcohol use, B vitamin labs in process.      - Vitamin B12  - Vitamin B1 whole blood     Return in about 1 week (around 10/17/2019), or if symptoms worsen or fail to improve.    Jose Antonio Lao MD  [facility]

## 2019-10-10 NOTE — LETTER
[facility]  [address] [facility] [facility] [address]  Phone: [phone]    October 10, 2019        Mark Nathan  [address] [facility] [address]          To whom it may concern:    RE: Mark Nathan    Patient was seen and treated today at our clinic.  He may need to remain off work for the next week as we continue to work-up his symptoms.      Please contact me for questions or concerns.      Sincerely,        Jose Antonio Lao MD

## 2019-10-13 LAB — VIT B1 BLD-MCNC: 98 NMOL/L (ref 70–180)

## 2019-10-14 DIAGNOSIS — R25.2 MUSCLE CRAMP: Primary | ICD-10-CM

## 2019-10-15 ENCOUNTER — TELEPHONE (OUTPATIENT)
Dept: FAMILY MEDICINE | Facility: CLINIC | Age: 54
End: 2019-10-15

## 2019-10-15 ENCOUNTER — ALLIED HEALTH/NURSE VISIT (OUTPATIENT)
Dept: FAMILY MEDICINE | Facility: CLINIC | Age: 54
End: 2019-10-15
Payer: COMMERCIAL

## 2019-10-15 DIAGNOSIS — M62.81 MUSCLE WEAKNESS (GENERALIZED): Primary | ICD-10-CM

## 2019-10-15 DIAGNOSIS — E55.9 VITAMIN D DEFICIENCY: ICD-10-CM

## 2019-10-15 DIAGNOSIS — R20.0 NUMBNESS IN BOTH LEGS: Primary | ICD-10-CM

## 2019-10-15 PROCEDURE — 99207 ZZC NO CHARGE NURSE ONLY: CPT

## 2019-10-15 NOTE — LETTER
Mark Nathan  [address] [place] [address]          To Whom This May Concern:

## 2019-10-15 NOTE — LETTER
[facility]  [address] [facility] [address]  Phone: [phone]    October 15, 2019        Mark Nathan  [address] [facility] [address]          To whom it may concern:    RE: Mark Venita    Patient may return to work tomorrow 10/16/19 with the following:  No restrictions    Please contact me for questions or concerns.      Sincerely,        Jose Antonio Lao MD

## 2019-10-15 NOTE — TELEPHONE ENCOUNTER
Letter printed and placed in my outbasket.  Seems reasonable to check Vit D level, I have placed an order.    Thanks,  Jose Antonio Lao MD

## 2019-10-15 NOTE — TELEPHONE ENCOUNTER
Dr. Lao,    Patient came in for a work note to return tomorrow morning.  He feels much better.  Was wondering if he should have his Vitamin D level checked to?.  Call patient when letter is ready. Letter started for your completion. Charu SANFORD RN

## 2019-10-17 DIAGNOSIS — R25.2 MUSCLE CRAMP: ICD-10-CM

## 2019-10-17 DIAGNOSIS — M62.81 MUSCLE WEAKNESS (GENERALIZED): ICD-10-CM

## 2019-10-17 LAB — CK SERPL-CCNC: 207 U/L (ref 30–300)

## 2019-10-17 PROCEDURE — 82306 VITAMIN D 25 HYDROXY: CPT | Performed by: INTERNAL MEDICINE

## 2019-10-17 PROCEDURE — 36415 COLL VENOUS BLD VENIPUNCTURE: CPT | Performed by: INTERNAL MEDICINE

## 2019-10-17 PROCEDURE — 82550 ASSAY OF CK (CPK): CPT | Performed by: INTERNAL MEDICINE

## 2019-10-18 PROBLEM — E55.9 VITAMIN D DEFICIENCY: Status: ACTIVE | Noted: 2019-10-18

## 2019-10-18 LAB — DEPRECATED CALCIDIOL+CALCIFEROL SERPL-MC: 16 UG/L (ref 20–75)

## 2019-11-08 ENCOUNTER — HEALTH MAINTENANCE LETTER (OUTPATIENT)
Age: 54
End: 2019-11-08

## 2020-01-13 ENCOUNTER — TRANSFERRED RECORDS (OUTPATIENT)
Dept: HEALTH INFORMATION MANAGEMENT | Facility: CLINIC | Age: 55
End: 2020-01-13

## 2020-03-16 ENCOUNTER — TRANSFERRED RECORDS (OUTPATIENT)
Dept: HEALTH INFORMATION MANAGEMENT | Facility: CLINIC | Age: 55
End: 2020-03-16

## 2020-06-11 ENCOUNTER — VIRTUAL VISIT (OUTPATIENT)
Dept: FAMILY MEDICINE | Facility: CLINIC | Age: 55
End: 2020-06-11
Payer: COMMERCIAL

## 2020-06-11 ENCOUNTER — NURSE TRIAGE (OUTPATIENT)
Dept: NURSING | Facility: CLINIC | Age: 55
End: 2020-06-11

## 2020-06-11 DIAGNOSIS — B34.9 VIRAL SYNDROME: Primary | ICD-10-CM

## 2020-06-11 PROCEDURE — 99213 OFFICE O/P EST LOW 20 MIN: CPT | Mod: 95 | Performed by: NURSE PRACTITIONER

## 2020-06-11 NOTE — PROGRESS NOTES
"Mark Nathan is a 54 year old male who is being evaluated via a billable telephone visit.      The patient has been notified of following:     \"This telephone visit will be conducted via a call between you and your physician/provider. We have found that certain health care needs can be provided without the need for a physical exam.  This service lets us provide the care you need with a short phone conversation.  If a prescription is necessary we can send it directly to your pharmacy.  If lab work is needed we can place an order for that and you can then stop by our lab to have the test done at a later time.    Telephone visits are billed at different rates depending on your insurance coverage. During this emergency period, for some insurers they may be billed the same as an in-person visit.  Please reach out to your insurance provider with any questions.    If during the course of the call the physician/provider feels a telephone visit is not appropriate, you will not be charged for this service.\"    Patient has given verbal consent for Telephone visit?  Yes    What phone number would you like to be contacted at? [phone]    How would you like to obtain your AVS? Mail a copy    Subjective     Mark Nathan is a 54 year old male who presents via phone visit today for the following health issues:    HPI  ENT Symptoms             Symptoms: cc Present Absent Comment   Fever/Chills  x  Chills;  No temp   Fatigue  x     Muscle Aches  x     Eye Irritation       Sneezing       Nasal Pj/Drg  x     Sinus Pressure/Pain   x    Loss of smell   x    Dental pain       Sore Throat   x    Swollen Glands       Ear Pain/Fullness   x    Cough  x  Clear, productive   Wheeze  x     Chest Pain   x    Shortness of breath   x    Rash   x    Other  x  Headache;  Diarrhea. Denies stiff neck, bloody stool, abdominal pain, vision changes.     Symptom duration:  2 -3days- patient wants covid pcr test.   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 No pertinent surgical history.    Social History     Tobacco Use     Smoking status: Current Every Day Smoker     Packs/day: 0.50     Years: 35.00     Pack years: 17.50     Types: Cigarettes     Smokeless tobacco: Never Used   Substance Use Topics     Alcohol use: Yes     Alcohol/week: 0.0 standard drinks     Comment: average 2 drinks per day     Family History   Problem Relation Age of Onset     Diabetes Father      Parkinsonism Paternal Grandmother      Alzheimer Disease Paternal Grandmother      Diabetes Paternal Grandfather      Diabetes Paternal Uncle      Diabetes Paternal Uncle            Reviewed and updated as needed this visit by Provider         Review of Systems   Constitutional, HEENT, cardiovascular, pulmonary, gi and gu systems are negative, except as otherwise noted.       Objective   Reported vitals:  There were no vitals taken for this visit.   alert and no distress  PSYCH: Alert and oriented times 3; coherent speech, normal   rate and volume, able to articulate logical thoughts, able   to abstract reason, no tangential thoughts, no hallucinations   or delusions  His affect is normal and pleasant  RESP: No cough, no audible wheezing, able to talk in full sentences  Remainder of exam unable to be completed due to telephone visits    Diagnostic Test Results:  none         Assessment/Plan:  1. Viral syndrome    - Symptomatic COVID-19 Virus (Coronavirus) by PCR; Future    No follow-ups on file.      Phone call duration:  8 minutes    KRISTAL Sarmiento CNP

## 2020-06-11 NOTE — PATIENT INSTRUCTIONS
"  Patient Education     Viral Syndrome (Adult)  A viral illness may cause a number of symptoms such as fever. Other symptoms depend on the part of the body that the virus affects. If it settles in your nose, throat, and lungs, it may cause cough, sore throat, congestion, runny nose, headache, earache and other ear symptoms, or shortness of breath. If it settles in your stomach and intestinal tract, it may cause nausea, vomiting, cramping, and diarrhea. Sometimes it causes generalized symptoms like \"aching all over,\" feeling tired, loss of energy, or loss of appetite.  A viral illness usually lasts anywhere from several days to several weeks, but sometimes it lasts longer. In some cases, a more serious infection can look like a viral syndrome in the first few days of the illness. You may need another exam and additional tests to know the difference. Watch for the warning signs listed below for when to seek medical advice.  Home care  Follow these guidelines for taking care of yourself at home:    If symptoms are severe, rest at home for the first 2 to 3 days.    Stay away from cigarette smoke - both your smoke and the smoke from others.    You may use over-the-counter acetaminophen or ibuprofen for fever, muscle aching, and headache, unless another medicine was prescribed for this. If you have chronic liver or kidney disease or ever had a stomach ulcer or gastrointestinal bleeding, talk with your healthcare provider before using these medicines. No one who is younger than 18 and ill with a fever should take aspirin. It may cause severe disease or death.    Your appetite may be poor, so a light diet is fine. Avoid dehydration by drinking 8 to 12, 8-ounce glasses of fluids each day. This may include water; orange juice; lemonade; apple, grape, and cranberry juice; clear fruit drinks; electrolyte replacement and sports drinks; and decaffeinated teas and coffee. If you have been diagnosed with a kidney disease, ask your " healthcare provider how much and what types of fluids you should drink to prevent dehydration. If you have kidney disease, drinking too much fluid can cause it build up in the your body and be dangerous to your health.    Over-the-counter remedies won't shorten the length of the illness but may be helpful for symptoms such as cough, sore throat, nasal and sinus congestion, or diarrhea. Don't use decongestants if you have high blood pressure.  Follow-up care  Follow up with your healthcare provider if you do not improve over the next week.  Call 911  Call 911 if any of the following occur:    Convulsion    Feeling weak, dizzy, or like you are going to faint    Chest pain, or more than mild shortness of breath  When to seek medical advice  Call your healthcare provider right away if any of these occur:    Cough with lots of colored sputum (mucus) or blood in your sputum    Chest pain, shortness of breath, wheezing, or trouble breathing    Severe headache; face, neck, or ear pain    Severe, constant pain in the lower right side of your belly (abdominal)    Continued vomiting (can t keep liquids down)    Frequent diarrhea (more than 5 times a day); blood (red or black color) or mucus in diarrhea    Feeling weak, dizzy, or like you are going to faint    Extreme thirst    Fever of 100.4 F (38 C) or higher, or as directed by your healthcare provider  Date Last Reviewed: 4/1/2018 2000-2019 [employer]. [address] [place] [address]. All rights reserved. This information is not intended as a substitute for professional medical care. Always follow your healthcare professional's instructions.

## 2020-06-11 NOTE — TELEPHONE ENCOUNTER
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    Pt himself states \"Started feeling ill 48 hours ago.\"  \"Cough.\"  \"Phlegm.\"  \"Horrible headache.\"  \"Every muscle in my body hurts.\"  \"Diarrhea.\"  \"Severe fatigue.\"  Intermittent \"chills\".    No chest pain or breathing symptoms.    Pt has stayed home since onset of symptoms.  Discussed timeframe for self-isolation as well as additional precautionary measures per current covid protocols.    Pt also requests PCR nasal swab covid testing.  Agrees to telephone provider visit to make this request.  Warm transferred to a  for an appointment now.    Melody ERVIN [facility] Nurse Advisor     Reason for Disposition    [1] COVID-19 infection suspected by caller or triager AND [2] mild symptoms (cough, fever, or others) AND [3] no complications or SOB    Additional Information    Negative: SEVERE difficulty breathing (e.g., struggling for each breath, speaks in single words)    Negative: Difficult to awaken or acting confused (e.g., disoriented, slurred speech)    Negative: Bluish (or gray) lips or face now    Negative: Shock suspected (e.g., cold/pale/clammy skin, too weak to stand, low BP, rapid pulse)    Negative: Sounds like a life-threatening emergency to the triager    Negative: [1] COVID-19 exposure AND [2] no symptoms    Negative: COVID-19 and Breastfeeding, questions about    Negative: [1] Adult with possible COVID-19 symptoms AND [2] triager concerned about severity of symptoms or other causes    Negative: SEVERE or constant chest pain or pressure (Exception: mild central chest pain, present only when coughing)    Negative: MODERATE difficulty breathing (e.g., speaks in phrases, SOB even at rest, pulse 100-120)    Negative: Patient sounds very sick or weak to the triager    Negative: MILD difficulty breathing (e.g., minimal/no SOB at rest, SOB with walking, pulse <100)    Negative: Chest pain or pressure    Negative: " Fever > 103 F (39.4 C)    Negative: [1] Fever > 101 F (38.3 C) AND [2] age > 60    Negative: [1] Fever > 100.0 F (37.8 C) AND [2] bedridden (e.g., nursing home patient, CVA, chronic illness, recovering from surgery)    Negative: HIGH RISK patient (e.g., age > 64 years, diabetes, heart or lung disease, weak immune system)    Negative: Fever present > 3 days (72 hours)    Negative: [1] Fever returns after gone for over 24 hours AND [2] symptoms worse or not improved    Negative: [1] Continuous (nonstop) coughing interferes with work or school AND [2] no improvement using cough treatment per protocol    [facility] Specific Disposition  - [facility] Specific Patient Instructions  COVID 19 Nurse Triage Plan/Patient Instructions    Please be aware that novel coronavirus (COVID-19) may be circulating in the community. If you develop symptoms such as fever, cough, or SOB or if you have concerns about the presence of another infection including coronavirus (COVID-19), please contact your health care provider or visit [email or web address].       Patient to schedule a Virtual Visit with provider. Reference Visit Selection Guide.    Thank you for taking steps to prevent the spread of this virus.  Limit your contact with others.  Wear a simple mask to cover your cough.  Wash your hands well and often.    Resources  [facility]: About COVID-19: [email or web address]  CDC: What to Do If You're Sick: www.cdc.gov/coronavirus/2019-ncov/about/steps-when-sick.html  CDC: Ending Home Isolation: www.cdc.gov/coronavirus/2019-ncov/hcp/disposition-in-home-patients.html   CDC: Caring for Someone: www.cdc.gov/coronavirus/2019-ncov/if-you-are-sick/care-for-someone.html   [facility]: Interim Guidance for Hospital Discharge to Home: www.health.[facility].mn.us/diseases/coronavirus/hcp/hospdischarge.pdf  [facility] clinical trials (COVID-19 research studies): clinicalaffairs.[facility]-clinical-trials   Below are the  COVID-19 hotlines at the [place] [facility] ([facility]). Interpreters are available.   For health questions: Call 069-632-7346 or 1-393.389.5374 (7 a.m. to 7 p.m.)  For questions about schools and childcare: Call 699-407-2942 or 1-290.273.5697 (7 a.m. to 7 p.m.)    Protocols used: CORONAVIRUS (COVID-19) DIAGNOSED OR [id number] 5.16.20

## 2020-06-12 DIAGNOSIS — B34.9 VIRAL SYNDROME: ICD-10-CM

## 2020-06-12 PROCEDURE — 99000 SPECIMEN HANDLING OFFICE-LAB: CPT | Performed by: NURSE PRACTITIONER

## 2020-06-12 PROCEDURE — U0003 INFECTIOUS AGENT DETECTION BY NUCLEIC ACID (DNA OR RNA); SEVERE ACUTE RESPIRATORY SYNDROME CORONAVIRUS 2 (SARS-COV-2) (CORONAVIRUS DISEASE [COVID-19]), AMPLIFIED PROBE TECHNIQUE, MAKING USE OF HIGH THROUGHPUT TECHNOLOGIES AS DESCRIBED BY CMS-2020-01-R: HCPCS | Mod: 90 | Performed by: NURSE PRACTITIONER

## 2020-06-12 NOTE — LETTER
June 14, 2020        Mark Nathan  [address] [facility] [address]    This letter provides a written record that you were tested for COVID-19 on 6/12/20.   Your result was negative.    This means that we didn t find the virus that causes COVID-19 in your sample. A test may show negative when you do actually have the virus. This can happen when the virus is in the early stages of infection, before you feel illness symptoms.    Even if you don t have symptoms, they may still appear. For safety, it s very important to follow these rules.    Keep yourself away from others (self-isolation):      Stay home. Don t go to work, school or anywhere else.     Stay in your own room (and use your own bathroom), if you can.    Stay away from others in your home. No hugging, kissing or shaking hands. No visitors.    Clean  high touch  surfaces often (doorknobs, counters, handles, etc.). Use a household cleaning spray or wipes.    Cover your mouth and nose with a mask, tissue or washcloth to avoid spreading germs.    Wash your hands and face often with soap and water.    Stay in self-isolation until you meet ALL of the guidelines below:    1. You have had no fever for at least 72 hours (that is 3 full days of no fever without the use of medicine that reduces fevers), AND  2. other symptoms (such as cough, shortness of breath) have gotten better, AND  3. at least 10 days have passed since your symptoms first appeared.    Going back to work  Check with your employer for any guidelines to follow for going back to work.    Employers: This document serves as formal notice that your employee tested negative for COVID-19, as of the testing date shown above.    For questions regarding this letter or your Negative COVID-19 result, call [phone] between 8A to 6:30P (M-F) and 10A to 6:30P (weekends).

## 2020-06-12 NOTE — LETTER
Shannen 15, 2020      Mark Nathan  [address] [address]        Dear ,    We are writing to inform you of your test results.    {results letter list:[id number]}    Resulted Orders   Symptomatic COVID-19 Virus (Coronavirus) by PCR   Result Value Ref Range    COVID-19 Virus PCR to [facility] - Source Nasopharyngeal     COVID-19 Virus PCR to [facility] - Result Not Detected       Comment:      Collection of multiple specimens from the same patient may be necessary to   detect the virus. The possibility of a false negative should be considered if   the patient's recent exposure or clinical presentation suggests 2019 nCOV   infection and diagnostic tests for other causes of illness are negative.   Repeat testing may be considered in this setting.  Viral RNA was extracted via a validated method and subsequently underwent   single step reverse transcriptase-real time polymerase chain reaction using   primers to the CDC specified N1,N2 gene targets of CoV2 and human RNP as an   internal control.  A negative result does not rule out the presence of real-time PCR inhibitors   in the specimen or COVID-19 RNA in concentrations below the limit of detection   of the assay. The possibility of a false negative should be considered if the   patients recent exposure or clinical presentation suggests COVID-19.   Additional testing or repeat testing requires consultation with the   laborator  y.  Nasopharyngeal specimen is the preferred choice for swab-based SARS CoV2   testing. When collection of a nasopharyngeal swab is not possible the   following are acceptable alternatives:  an oropharyngeal (OP) specimen collected by a healthcare professional, or a   nasal mid-turbinate (NMT) swab collected by a healthcare professional or by   onsite self-collection (using a flocked tapered swab), or an anterior nares   specimen collected by a healthcare professional or by onsite self-collection   (using a round foam swab). ([facility]  for Disease Control)  Testing performed by [facility], Room [address], [address] [place] [address], [place] [address]. This test was developed and its   performance characteristics determined by the [facility]   [place]. It has not been cleared or approved by the FDA.  The laboratory is regulated under the Clinical Laboratory Improvement   Amendments of 1988 (CLIA-88) as qualified to perform high-complexity testin  g.   This test is used for clinical purposes. It should not be regarded as   investigational or for research.         If you have any questions or concerns, please call the clinic at the number listed above.       Sincerely,        Michael Flannery Md Geetha 1

## 2020-06-13 LAB
SARS-COV-2 RNA SPEC QL NAA+PROBE: NOT DETECTED
SPECIMEN SOURCE: NORMAL

## 2020-06-23 ENCOUNTER — TRANSFERRED RECORDS (OUTPATIENT)
Dept: HEALTH INFORMATION MANAGEMENT | Facility: CLINIC | Age: 55
End: 2020-06-23

## 2020-07-27 ENCOUNTER — E-VISIT (OUTPATIENT)
Dept: FAMILY MEDICINE | Facility: CLINIC | Age: 55
End: 2020-07-27
Payer: COMMERCIAL
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
## 2020-07-27 PROCEDURE — 99421 OL DIG E/M SVC 5-10 MIN: CPT | Performed by: INTERNAL MEDICINE

## 2020-07-28 RX ORDER — DOXYCYCLINE HYCLATE 100 MG
200 TABLET ORAL ONCE
Qty: 2 TABLET | Refills: 0 | Status: SHIPPED | OUTPATIENT
Start: 2020-07-28 | End: 2020-07-28

## 2020-07-28 NOTE — PATIENT INSTRUCTIONS
Thank you for choosing us for your care. I have placed an order for a prescription so that you can start treatment. View your full visit summary for details by clicking on the link below. Your pharmacist will able to address any questions you may have about the medication.     If you're not feeling better within 5-7 days, please schedule an appointment.  You can schedule an appointment right here in [employer], or call [phone]  If the visit is for the same symptoms as your e-visit, we'll refund the cost of your e-visit if seen within seven days.

## 2020-09-14 ENCOUNTER — OFFICE VISIT (OUTPATIENT)
Dept: FAMILY MEDICINE | Facility: CLINIC | Age: 55
End: 2020-09-14
Payer: COMMERCIAL

## 2020-09-14 VITALS
WEIGHT: 154.5 LBS | BODY MASS INDEX: 23.67 KG/M2 | OXYGEN SATURATION: 96 % | HEART RATE: 104 BPM | DIASTOLIC BLOOD PRESSURE: 70 MMHG | SYSTOLIC BLOOD PRESSURE: 138 MMHG | RESPIRATION RATE: 13 BRPM | TEMPERATURE: 98.9 F

## 2020-09-14 DIAGNOSIS — M54.2 CERVICALGIA: Primary | ICD-10-CM

## 2020-09-14 PROCEDURE — 99213 OFFICE O/P EST LOW 20 MIN: CPT | Performed by: NURSE PRACTITIONER

## 2020-09-14 RX ORDER — PREDNISONE 20 MG/1
40 TABLET ORAL DAILY
Qty: 10 TABLET | Refills: 0 | Status: SHIPPED | OUTPATIENT
Start: 2020-09-14 | End: 2020-09-19

## 2020-09-14 RX ORDER — CYCLOBENZAPRINE HCL 10 MG
10 TABLET ORAL 3 TIMES DAILY PRN
Qty: 30 TABLET | Refills: 1 | Status: SHIPPED | OUTPATIENT
Start: 2020-09-14

## 2020-09-14 NOTE — LETTER
[facility]  [address] [facility] [address]  [phone]          September 14, 2020    RE:  Mark Nathan                                                                                                                                                       [address] [facility] [address]            To whom it may concern:    Mark Nathan was seen in my clinic today. Please excuse him from work for medical reasons through Wednesday September 16. He may return to work without restrictions September 17, 2020.      Sincerely,          Mee Del Valle, CNP

## 2020-09-14 NOTE — PATIENT INSTRUCTIONS
Discussed strengthening, and stretching - handout given.  Ice or heat as preferred at least twice daily.   NSAID (ibuprofen 600 mg every 6 hours or aleve 2 tabs twice daily) for pain and inflammation.  Return for Follow up if not improving in 2 weeks.   Consider Physical therapy at that time if needed.            Thank you for choosing [facility]3].  You may be receiving an email and/or telephone survey request from [facility] Customer Experience regarding your visit today.  Please take a few minutes to respond to the survey to let us know how we are doing.      If you have questions or concerns, please contact us via [employer] or you can contact your care team at [phone].    Our Clinic hours are:  Monday 6:40 am  to 7:00 pm  Tuesday -Friday 6:40 am to 5:00 pm    The [place] outpatient lab hours are:  Monday - Friday 6:10 am to 4:45 pm  Saturdays 7:00 am to 11:00 am  Appointments are required, call [phone]    If you have clinical questions after hours or would like to schedule an appointment,  call the clinic at [phone].

## 2020-09-14 NOTE — PROGRESS NOTES
Subjective     Mark Nathan is a 54 year old male who presents to clinic today for the following health issues:    HPI       Neck Pain  Onset/Duration: 3 days - woke up with it one morning  Description:   Location: left side of neck  Radiation: head and side of throat  Intensity: severe  Progression of Symptoms:  Improved today.  Accompanying Signs & Symptoms:  Burning, tingling, prickly sensation in arm(s): no  Numbness in arm(s): no  Weakness in arm(s):  no  Fever: no  Headache: YES- behind the ear  Nausea and/or vomiting: no  History:   Trauma: no  Previous neck pain: no  Previous surgery or injections: no  Previous Imaging (MRI,X ray): no  Precipitating or alleviating factors: None  Does movement impact the pain:  YES - having difficulty with side to side, and up and down movements  Therapies tried and outcome: started a RX of Amoxicillin, thinking that it is a lymph node infection. Codeine, leftover for pain.  Heat is helpful.        Review of Systems   Constitutional, HEENT, cardiovascular, pulmonary, gi and gu systems are negative, except as otherwise noted.      Objective    /70   Pulse 104   Temp 98.9  F (37.2  C) (Tympanic)   Resp 13   Wt 70.1 kg (154 lb 8 oz)   SpO2 96%   BMI 23.67 kg/m    Body mass index is 23.67 kg/m .  Physical Exam   GENERAL: healthy, alert and no distress  HENT: normal cephalic/atraumatic, ear canals and TM's normal, nose and mouth without ulcers or lesions, oropharynx clear and oral mucous membranes moist  NECK: no adenopathy, no asymmetry, masses, or scars and thyroid normal to palpation  No pain with palpation of neck and upper back. Neck ROM limited due to pain to 30% of normal            Assessment & Plan     Cervicalgia  - cyclobenzaprine (FLEXERIL) 10 MG tablet; Take 1 tablet (10 mg) by mouth 3 times daily as needed for muscle spasms  - predniSONE (DELTASONE) 20 MG tablet; Take 2 tablets (40 mg) by mouth daily for 5 days     Patient Instructions   Discussed  strengthening, and stretching - handout given.  Ice or heat as preferred at least twice daily.   NSAID (ibuprofen 600 mg every 6 hours or aleve 2 tabs twice daily) for pain and inflammation.  Return for Follow up if not improving in 2 weeks.   Consider Physical therapy at that time if needed.            Thank you for choosing [facility].  You may be receiving an email and/or telephone survey request from [facility] Customer Experience regarding your visit today.  Please take a few minutes to respond to the survey to let us know how we are doing.      If you have questions or concerns, please contact us via [employer] or you can contact your care team at [phone].    Our Clinic hours are:  Monday 6:40 am  to 7:00 pm  Tuesday -Friday 6:40 am to 5:00 pm    The [place] outpatient lab hours are:  Monday - Friday 6:10 am to 4:45 pm  Saturdays 7:00 am to 11:00 am  Appointments are required, call [phone]    If you have clinical questions after hours or would like to schedule an appointment,  call the clinic at [phone].        Return in about 2 weeks (around 9/28/2020), or if symptoms worsen or fail to improve.    The risks, benefits and treatment options of prescribed medications or other treatments have been discussed with the patient. The patient verbalized their understanding and should call or follow up if no improvement or if they develop further problems.    KRISTAL Rodriguez [facility]

## 2020-12-06 ENCOUNTER — HEALTH MAINTENANCE LETTER (OUTPATIENT)
Age: 55
End: 2020-12-06

## 2021-05-31 ENCOUNTER — RECORDS - HEALTHEAST (OUTPATIENT)
Dept: ADMINISTRATIVE | Facility: CLINIC | Age: 56
End: 2021-05-31

## 2021-09-25 ENCOUNTER — HEALTH MAINTENANCE LETTER (OUTPATIENT)
Age: 56
End: 2021-09-25

## 2021-10-27 ENCOUNTER — HOSPITAL ENCOUNTER (EMERGENCY)
Facility: CLINIC | Age: 56
Discharge: HOME | End: 2021-10-27
Attending: PHYSICIAN ASSISTANT | Admitting: PHYSICIAN ASSISTANT
Payer: COMMERCIAL

## 2021-10-27 ENCOUNTER — APPOINTMENT (OUTPATIENT)
Dept: CT IMAGING | Facility: CLINIC | Age: 56
End: 2021-10-27
Attending: EMERGENCY MEDICINE
Payer: COMMERCIAL

## 2021-10-27 ENCOUNTER — MYC MEDICAL ADVICE (OUTPATIENT)
Dept: FAMILY MEDICINE | Facility: CLINIC | Age: 56
End: 2021-10-27

## 2021-10-27 ENCOUNTER — TELEPHONE (OUTPATIENT)
Dept: FAMILY MEDICINE | Facility: CLINIC | Age: 56
End: 2021-10-27

## 2021-10-27 VITALS
OXYGEN SATURATION: 99 % | RESPIRATION RATE: 18 BRPM | SYSTOLIC BLOOD PRESSURE: 147 MMHG | HEART RATE: 69 BPM | TEMPERATURE: 98.3 F | DIASTOLIC BLOOD PRESSURE: 103 MMHG

## 2021-10-27 DIAGNOSIS — R55 SYNCOPE, UNSPECIFIED SYNCOPE TYPE: ICD-10-CM

## 2021-10-27 DIAGNOSIS — S16.1XXA STRAIN OF NECK MUSCLE, INITIAL ENCOUNTER: ICD-10-CM

## 2021-10-27 DIAGNOSIS — K11.8 PAROTID NODULE: ICD-10-CM

## 2021-10-27 LAB
ALBUMIN SERPL-MCNC: 3.4 G/DL (ref 3.4–5)
ALP SERPL-CCNC: 99 U/L (ref 40–150)
ALT SERPL W P-5'-P-CCNC: 29 U/L (ref 0–70)
ANION GAP SERPL CALCULATED.3IONS-SCNC: 3 MMOL/L (ref 3–14)
AST SERPL W P-5'-P-CCNC: 16 U/L (ref 0–45)
BASOPHILS # BLD AUTO: 0 10E3/UL (ref 0–0.2)
BASOPHILS NFR BLD AUTO: 1 %
BILIRUB SERPL-MCNC: 0.3 MG/DL (ref 0.2–1.3)
BUN SERPL-MCNC: 11 MG/DL (ref 7–30)
CALCIUM SERPL-MCNC: 8.6 MG/DL (ref 8.5–10.1)
CHLORIDE BLD-SCNC: 111 MMOL/L (ref 94–109)
CO2 SERPL-SCNC: 26 MMOL/L (ref 20–32)
CREAT SERPL-MCNC: 1.27 MG/DL (ref 0.66–1.25)
EOSINOPHIL # BLD AUTO: 0.1 10E3/UL (ref 0–0.7)
EOSINOPHIL NFR BLD AUTO: 1 %
ERYTHROCYTE [DISTWIDTH] IN BLOOD BY AUTOMATED COUNT: 13.3 % (ref 10–15)
GFR SERPL CREATININE-BSD FRML MDRD: 63 ML/MIN/1.73M2
GLUCOSE BLD-MCNC: 85 MG/DL (ref 70–99)
HCT VFR BLD AUTO: 46.5 % (ref 40–53)
HGB BLD-MCNC: 15.6 G/DL (ref 13.3–17.7)
HOLD SPECIMEN: NORMAL
IMM GRANULOCYTES # BLD: 0 10E3/UL
IMM GRANULOCYTES NFR BLD: 0 %
LYMPHOCYTES # BLD AUTO: 1.5 10E3/UL (ref 0.8–5.3)
LYMPHOCYTES NFR BLD AUTO: 25 %
MCH RBC QN AUTO: 34.4 PG (ref 26.5–33)
MCHC RBC AUTO-ENTMCNC: 33.5 G/DL (ref 31.5–36.5)
MCV RBC AUTO: 102 FL (ref 78–100)
MONOCYTES # BLD AUTO: 0.4 10E3/UL (ref 0–1.3)
MONOCYTES NFR BLD AUTO: 7 %
NEUTROPHILS # BLD AUTO: 4.2 10E3/UL (ref 1.6–8.3)
NEUTROPHILS NFR BLD AUTO: 66 %
NRBC # BLD AUTO: 0 10E3/UL
NRBC BLD AUTO-RTO: 0 /100
PLATELET # BLD AUTO: 217 10E3/UL (ref 150–450)
POTASSIUM BLD-SCNC: 4.2 MMOL/L (ref 3.4–5.3)
PROT SERPL-MCNC: 7 G/DL (ref 6.8–8.8)
RBC # BLD AUTO: 4.54 10E6/UL (ref 4.4–5.9)
SODIUM SERPL-SCNC: 140 MMOL/L (ref 133–144)
TROPONIN I SERPL-MCNC: <0.015 UG/L (ref 0–0.04)
WBC # BLD AUTO: 6.2 10E3/UL (ref 4–11)

## 2021-10-27 PROCEDURE — 84484 ASSAY OF TROPONIN QUANT: CPT | Performed by: EMERGENCY MEDICINE

## 2021-10-27 PROCEDURE — 72128 CT CHEST SPINE W/O DYE: CPT

## 2021-10-27 PROCEDURE — 85025 COMPLETE CBC W/AUTO DIFF WBC: CPT | Performed by: EMERGENCY MEDICINE

## 2021-10-27 PROCEDURE — 36415 COLL VENOUS BLD VENIPUNCTURE: CPT | Performed by: EMERGENCY MEDICINE

## 2021-10-27 PROCEDURE — 99284 EMERGENCY DEPT VISIT MOD MDM: CPT | Mod: 25 | Performed by: EMERGENCY MEDICINE

## 2021-10-27 PROCEDURE — 93010 ELECTROCARDIOGRAM REPORT: CPT | Performed by: EMERGENCY MEDICINE

## 2021-10-27 PROCEDURE — 80053 COMPREHEN METABOLIC PANEL: CPT | Performed by: EMERGENCY MEDICINE

## 2021-10-27 PROCEDURE — 99285 EMERGENCY DEPT VISIT HI MDM: CPT | Mod: 25

## 2021-10-27 PROCEDURE — 93005 ELECTROCARDIOGRAM TRACING: CPT

## 2021-10-27 PROCEDURE — 72125 CT NECK SPINE W/O DYE: CPT

## 2021-10-27 PROCEDURE — 70450 CT HEAD/BRAIN W/O DYE: CPT

## 2021-10-27 NOTE — TELEPHONE ENCOUNTER
Pt seen in ED today for syncope  Was worked up and discharged  States only sleeps 2-3 hrs per night since divorce 3-4 mo ago.  Wondering if he could get something for sleep  Pt has appt tomorrow. Advised him to wait for appt to discuss.  Pt quit drinking after wife left  Needs appt with ENT for suspicious nodule in parotid area. Transferred pt to ENT scheduling.    Tylor Villalpando RN

## 2021-10-27 NOTE — ED PROVIDER NOTES
History     Chief Complaint   Patient presents with     Syncope     unwitnessed fall at work  Patient hadnt been feeling well today       HPI  Mark Nathan is a 56 year old male who presents by ambulance from work.  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  He was feeling well this morning, ate his usual breakfast, was working for several hours done and his knees looked up and turned his head developed sharp chest pain lasting a few minutes felt as if a catch were present while taking a deep breath.  Denies feeling near syncopal at that time although felt lightheaded.  Denies diaphoresis or nausea.  No associated headache or focal neurologic complaint.  He got up walked outside for a few minutes got some fresh air, his lightheadedness cleared.  Went back to work, was down on his knees in the next thing he knows he was lying on his back with neck pain looking up at people who were staring down at him.  Unknown duration of syncope.  No reported seizure activity.  Did not lose control of his bowel or bladder, did not bite his tongue or cheeks.  He is not anticoagulated or and on antiplatelet therapy.  He smokes tobacco.  Denies alcohol or illicit substance use.  No prescription medications.  Allergies:  No Known Allergies    Problem List:    Patient Active Problem List    Diagnosis Date Noted     Vitamin D deficiency 10/18/2019     Priority: Medium     Elevated glucose 03/21/2016     Priority: Medium     Alcohol dependence in remission (H) 03/21/2016     Priority: Medium     Tobacco abuse 03/17/2016     Priority: Medium     Numbness in both legs 03/17/2016     Priority: Medium        Past Medical History:    Past Medical History:   Diagnosis Date     Alcohol dependence in remission (H) 3/21/2016       Past Surgical History:    No past surgical history on file.    Family History:    Family History   Problem Relation Age of Onset     Diabetes Father      Parkinsonism Paternal Grandmother       Alzheimer Disease Paternal Grandmother      Diabetes Paternal Grandfather      Diabetes Paternal Uncle      Diabetes Paternal Uncle        Social History:  Marital Status:   [2]  Social History     Tobacco Use     Smoking status: Current Every Day Smoker     Packs/day: 0.50     Years: 35.00     Pack years: 17.50     Types: Cigarettes     Smokeless tobacco: Never Used   Substance Use Topics     Alcohol use: Yes     Alcohol/week: 0.0 standard drinks     Comment: average 2 drinks per day     Drug use: No        Medications:    ASPIRIN PO  Cholecalciferol (VITAMIN D3 PO)  cyclobenzaprine (FLEXERIL) 10 MG tablet  folic acid-vit B6-vit B12 (FOLGARD) 0.8-10-0.115 MG TABS  Multiple Vitamins-Minerals (CENTRUM ULTRA MENS PO)  Nutritional Supplements (BOOST 100 CALORIE SMART PO)  Thiamine HCl (VITAMIN B-1 PO)          Review of Systems  All other systems reviewed and are negative.    Physical Exam   BP: (!) 168/86  Pulse: 65  Temp: 98.3  F (36.8  C)  Resp: 18  SpO2: 99 %      Physical Exam  Nontoxic-appearing no respiratory distress alert and oriented x3. GCS 15 on arrival, throughout stay and at discharge.    Head atraumatic normocephalic    Cranial nerves; vision baseline fields intact, PERRL, EOMI, facial sensation intact to light touch, facial muscle tone intact and symmetrical, hearing grossly intact,swallowing without difficulty, voice baseline and normal, SCM  strength intact, tongue protrudes midline.  Palatal elevation symmetric    Oropharynx moist without lesions or erythema.  No facial trauma appreciated, mandible full range of motion without trismus or malocclusion    Neck cervical collar in place, tenderness to palpation lower C-spine and upper T-spine without crepitus    Spine nontender to palpation other than above, patient was logrolled with inline cervical immobilization.    Pelvis stable nontender    Chest nontender    No outward sign of trauma to the chest back or abdomen    Lungs clear to  auscultation no rales rhonchi or wheezes    Heart regular no murmur S3 or rub    Abdomen soft nontender bowel sounds positive no masses or HSM    Strength and sensation intact throughout the extremities, skin clear from rash or lesion.    Extremities are atraumatic, full painless active range of motion all joints      ED Course    Chest pain, brief syncopal episode, lower neck upper back pain and tenderness.  Work-up for syncope as well as injury.        Procedures              Critical Care time:  none       EKG Interpretation:      Interpreted by Coy Leon MD, MD  Time reviewed: 1205  Symptoms at time of EKG: None   Rhythm: Normal sinus   Rate: Normal  Axis: Normal  Ectopy: None  Conduction: Normal  ST Segments/ T Waves: No ST-T wave changes and No acute ischemic changes  Q Waves: None  Comparison to prior: Unchanged    Clinical Impression: normal EKG                  Results for orders placed or performed during the hospital encounter of 10/27/21 (from the past 24 hour(s))   CBC with platelets differential    Narrative    The following orders were created for panel order CBC with platelets differential.  Procedure                               Abnormality         Status                     ---------                               -----------         ------                     CBC with platelets and d...[[id number]]  Abnormal            Final result                 Please view results for these tests on the individual orders.   Comprehensive metabolic panel   Result Value Ref Range    Sodium 140 133 - 144 mmol/L    Potassium 4.2 3.4 - 5.3 mmol/L    Chloride 111 (H) 94 - 109 mmol/L    Carbon Dioxide (CO2) 26 20 - 32 mmol/L    Anion Gap 3 3 - 14 mmol/L    Urea Nitrogen 11 7 - 30 mg/dL    Creatinine 1.27 (H) 0.66 - 1.25 mg/dL    Calcium 8.6 8.5 - 10.1 mg/dL    Glucose 85 70 - 99 mg/dL    Alkaline Phosphatase 99 40 - 150 U/L    AST 16 0 - 45 U/L    ALT 29 0 - 70 U/L    Protein Total 7.0 6.8 - 8.8 g/dL    Albumin  3.4 3.4 - 5.0 g/dL    Bilirubin Total 0.3 0.2 - 1.3 mg/dL    GFR Estimate 63 >60 mL/min/1.73m2   Troponin I   Result Value Ref Range    Troponin I <0.015 0.000 - 0.045 ug/L   [place] Draw    Narrative    The following orders were created for panel order [place] Draw.  Procedure                               Abnormality         Status                     ---------                               -----------         ------                     Extra Blue Top Tube[[id number]]                              Final result               Extra Red Top Tube[[id number]]                               Final result               Extra Green Top (Lithium...[[id number]]                      Final result               Extra Purple Top Tube[[id number]]                            Final result                 Please view results for these tests on the individual orders.   CBC with platelets and differential   Result Value Ref Range    WBC Count 6.2 4.0 - 11.0 10e3/uL    RBC Count 4.54 4.40 - 5.90 10e6/uL    Hemoglobin 15.6 13.3 - 17.7 g/dL    Hematocrit 46.5 40.0 - 53.0 %     (H) 78 - 100 fL    MCH 34.4 (H) 26.5 - 33.0 pg    MCHC 33.5 31.5 - 36.5 g/dL    RDW 13.3 10.0 - 15.0 %    Platelet Count 217 150 - 450 10e3/uL    % Neutrophils 66 %    % Lymphocytes 25 %    % Monocytes 7 %    % Eosinophils 1 %    % Basophils 1 %    % Immature Granulocytes 0 %    NRBCs per 100 WBC 0 <1 /100    Absolute Neutrophils 4.2 1.6 - 8.3 10e3/uL    Absolute Lymphocytes 1.5 0.8 - 5.3 10e3/uL    Absolute Monocytes 0.4 0.0 - 1.3 10e3/uL    Absolute Eosinophils 0.1 0.0 - 0.7 10e3/uL    Absolute Basophils 0.0 0.0 - 0.2 10e3/uL    Absolute Immature Granulocytes 0.0 <=0.0 10e3/uL    Absolute NRBCs 0.0 10e3/uL   Extra Blue Top Tube   Result Value Ref Range    Hold Specimen JIC    Extra Red Top Tube   Result Value Ref Range    Hold Specimen JIC    Extra Green Top (Lithium Heparin) Tube   Result Value Ref Range    Hold Specimen JIC    Extra Purple Top Tube   Result  Value Ref Range    Hold Specimen JI    CT Head w/o Contrast    Narrative    CT SCAN OF THE HEAD WITHOUT CONTRAST   10/27/2021 12:49 PM     HISTORY: Altered mental status. Pain.    TECHNIQUE:  Axial images of the head and coronal reformations without  IV contrast material. Radiation dose for this scan was reduced using  automated exposure control, adjustment of the mA and/or kV according  to patient size, or iterative reconstruction technique.    COMPARISON: None.    FINDINGS: There is no evidence of intracranial hemorrhage, mass, acute  infarct or anomaly. The ventricles are normal in size, shape and  configuration. The brain parenchyma and subarachnoid spaces are  normal.     The visualized portions of the sinuses and mastoids appear normal. The  bony calvarium and bones of the skull base appear intact.       Impression    IMPRESSION:   No evidence of acute intracranial hemorrhage, mass, or  herniation.    JOAO BLACKMON MD         SYSTEM ID:  [id number]   CT Cervical Spine w/o Contrast    Narrative    CT CERVICAL SPINE WITHOUT CONTRAST 10/27/2021 12:49 PM     HISTORY: Altered mental status. Neck pain.     TECHNIQUE: Axial images of the cervical spine were obtained without  intravenous contrast. Multiplanar reformations were performed.   Radiation dose for this scan was reduced using automated exposure  control, adjustment of the mA and/or kV according to patient size, or  iterative reconstruction technique.    COMPARISON: None.    FINDINGS: No evidence of fracture. No prevertebral soft tissue  swelling. Alignment is normal. Vertebral body height is maintained. No  destructive osseous lesions.     Mild-to-moderate degenerative endplate changes and loss of disc height  throughout the cervical spine, most pronounced at C3-4 and C6-7. No  significant spinal canal narrowing at any level. Mild neural foraminal  narrowings at C3-4, C5-6, and C6-7 due to disc osteophyte complexes.    Visualized paraspinous tissues: Suspicious  soft tissue nodule in the  right parotid gland measuring up to 1.3 cm.      Impression    IMPRESSION:   1. No evidence of acute fracture or subluxation in the cervical spine.  2. Degenerative changes in the cervical spine as described above.   3. Suspicious soft tissue nodule in the right parotid gland measuring  up to 1.3 cm. This could represent a benign or malignant parotid  lesion. Otolaryngology consultation is recommended.      JOAO BLACKMON MD         SYSTEM ID:  [id number]   CT Thoracic Spine w/o Contrast    Narrative    CT THORACIC SPINE WITHOUT CONTRAST   10/27/2021 12:50 PM     HISTORY: Fall. Altered mental status. Pain.     TECHNIQUE: Axial images of the thoracic spine were obtained without  intravenous contrast. Multiplanar reformations were performed.   Radiation dose for this scan was reduced using automated exposure  control, adjustment of the mA and/or kV according to patient size, or  iterative reconstruction technique.    COMPARISON: None.    FINDINGS:  Normal thoracic kyphosis. Anterior posterior alignment of  the thoracic spine within normal limits. No loss of vertebral body  height. No lucent fracture lines identified.     No significant loss of intervertebral disc height. No significant disc  herniation. Anterior osteophytic spurring in the upper and lower  thoracic spine. Normal facets. No significant spinal canal or neural  foraminal narrowing.     Calcified granuloma in the left lower lobe.      Impression    IMPRESSION: No evidence of acute fracture or subluxation in the  thoracic spine.     JOAO BLCAKMON MD         SYSTEM ID:  [id number]       Medications - No data to display    Assessments & Plan (with Medical Decision Making)  Brief syncopal episode as described in HPI.  Usual differential considered including but not limited to metabolic derangement, dysrhythmia, sepsis, anemia, pulmonary embolism versus other.  His ECG is without acute ischemic change troponin normal.  CT head neck thoracic  spine negative for acute finding.  Remainder of labs normal except for mild elevation of creatinine.  Could be prerenal, episode may be secondary to dehydration although doubtful.  Etiology remains undetermined.  Discussed admission patient defers.  Recommend outpatient follow-up, probable cardiac stress.  Incidental right parotid gland nodule requires follow-up, ENT number dispensed.  Return criteria reviewed     I have reviewed the nursing notes.    I have reviewed the findings, diagnosis, plan and need for follow up with the patient.       New Prescriptions    No medications on file       Final diagnoses:   Syncope, unspecified syncope type   Strain of neck muscle, initial encounter   Parotid nodule       10/27/2021   [facility] EMERGENCY DEPT     Coy Leon MD  10/27/21 4611

## 2021-10-27 NOTE — DISCHARGE INSTRUCTIONS
You require follow-up for an incidental nodule that was found in your parotid salivary gland on the right side.  Call ENT and schedule appointment.    Your kidney function is a little bit low compared to baseline, this may be secondary to dehydration which may be the cause of your fainting episode this morning.  Recommend drinking plenty of clear liquids, so that your urine is fairly clear and not dark and colored.    Establish primary care for further evaluation, given passing out, history of tobacco use and chest discomfort today it is reasonable to investigate further, possible cardiac stress test    Return here for chest pain, shortness of air, recurrent fainting or any other concern.

## 2021-10-28 ENCOUNTER — OFFICE VISIT (OUTPATIENT)
Dept: FAMILY MEDICINE | Facility: CLINIC | Age: 56
End: 2021-10-28
Payer: COMMERCIAL

## 2021-10-28 VITALS
HEART RATE: 81 BPM | WEIGHT: 145.3 LBS | DIASTOLIC BLOOD PRESSURE: 80 MMHG | OXYGEN SATURATION: 98 % | BODY MASS INDEX: 22.26 KG/M2 | SYSTOLIC BLOOD PRESSURE: 122 MMHG | TEMPERATURE: 97 F

## 2021-10-28 DIAGNOSIS — F43.22 ADJUSTMENT DISORDER WITH ANXIOUS MOOD: Primary | ICD-10-CM

## 2021-10-28 DIAGNOSIS — R55 SYNCOPE, UNSPECIFIED SYNCOPE TYPE: ICD-10-CM

## 2021-10-28 DIAGNOSIS — Z72.0 TOBACCO ABUSE: ICD-10-CM

## 2021-10-28 DIAGNOSIS — G47.00 INSOMNIA, UNSPECIFIED TYPE: ICD-10-CM

## 2021-10-28 LAB
ANION GAP SERPL CALCULATED.3IONS-SCNC: 3 MMOL/L (ref 3–14)
BUN SERPL-MCNC: 11 MG/DL (ref 7–30)
CALCIUM SERPL-MCNC: 8.7 MG/DL (ref 8.5–10.1)
CHLORIDE BLD-SCNC: 107 MMOL/L (ref 94–109)
CO2 SERPL-SCNC: 28 MMOL/L (ref 20–32)
CREAT SERPL-MCNC: 1.08 MG/DL (ref 0.66–1.25)
GFR SERPL CREATININE-BSD FRML MDRD: 76 ML/MIN/1.73M2
GLUCOSE BLD-MCNC: 99 MG/DL (ref 70–99)
POTASSIUM BLD-SCNC: 4.1 MMOL/L (ref 3.4–5.3)
SODIUM SERPL-SCNC: 138 MMOL/L (ref 133–144)

## 2021-10-28 PROCEDURE — 36415 COLL VENOUS BLD VENIPUNCTURE: CPT | Performed by: NURSE PRACTITIONER

## 2021-10-28 PROCEDURE — 80048 BASIC METABOLIC PNL TOTAL CA: CPT | Performed by: NURSE PRACTITIONER

## 2021-10-28 PROCEDURE — 99214 OFFICE O/P EST MOD 30 MIN: CPT | Performed by: NURSE PRACTITIONER

## 2021-10-28 RX ORDER — TRAZODONE HYDROCHLORIDE 50 MG/1
50 TABLET, FILM COATED ORAL AT BEDTIME
Qty: 30 TABLET | Refills: 3 | Status: SHIPPED | OUTPATIENT
Start: 2021-10-28

## 2021-10-28 RX ORDER — BUPROPION HYDROCHLORIDE 150 MG/1
150 TABLET ORAL EVERY MORNING
Qty: 30 TABLET | Refills: 3 | Status: SHIPPED | OUTPATIENT
Start: 2021-10-28

## 2021-10-28 NOTE — LETTER
[facility]  [address] [address]  Phone: [phone]    October 28, 2021        Mark Nathan  [address] [facility] [address]          To whom it may concern:    RE: Mark Nathan    Patient was seen and treated today at our clinic and missed work.  Patient may return to work on November 1 st if still continue to feel well with the following:  No restrictions    Please contact me for questions or concerns.      Sincerely,        KRISTAL Frey CNP

## 2021-10-28 NOTE — PROGRESS NOTES
"  Assessment & Plan     Adjustment disorder with anxious mood    - traZODone (DESYREL) 50 MG tablet; Take 1 tablet (50 mg) by mouth At Bedtime    Insomnia, unspecified type  -states he is not sleeping well, going through divorce and its causing stress  - recommended to try traZODone (DESYREL) 50 MG tablet; Take 1 tablet (50 mg) by mouth At Bedtime    Syncope, unspecified syncope type  -unclear etiology, possibly due to lack of sleep and dehydration,  evaluation in ED was normal except for slightly elevated Creatinine level and chloride   -he is feeling well, denies chest pains, palpitations, dizziness and shortness of breath. He can return to work on Monday next week  -recommended to complete stress ECHO in the future   - Basic metabolic panel  (Ca, Cl, CO2, Creat, Gluc, K, Na, BUN); Future  - Echocardiogram Exercise Stress; Future  - Basic metabolic panel  (Ca, Cl, CO2, Creat, Gluc, K, Na, BUN)    Tobacco abuse  -recommended to quit smoking, he will try it with Wellbutrin   - CT Chest Lung Cancer Scrn Low Dose wo; Future  - buPROPion (WELLBUTRIN XL) 150 MG 24 hr tablet; Take 1 tablet (150 mg) by mouth every morning      Tobacco Cessation:   reports that he has been smoking cigarettes. He has a 17.50 pack-year smoking history. He has never used smokeless tobacco.  Tobacco Cessation Action Plan: Pharmacotherapies : Zyban/Wellbutrin    See Patient Instructions      KRISTAL Frey CNP  [facility] is a 56 year old who presents for the following health issues     HPI     ED/ Followup:    Facility:  [facility]   Date of visit: 10/27/21    Reason for visit: Syncope, unspecified syncope type, strain of neck muscle, Parotid nodule   Current Status: He is off work tell next WED, is not sleeping well. States he feels a little better but still feels like he is in a \"fog\"            Review of Systems   Constitutional, HEENT, cardiovascular, pulmonary, gi and gu systems are " negative, except as otherwise noted.      Objective    /80 (BP Location: Right arm, Patient Position: Sitting, Cuff Size: Adult Large)   Pulse 81   Temp 97  F (36.1  C) (Tympanic)   Wt 65.9 kg (145 lb 4.8 oz)   SpO2 98%   BMI 22.26 kg/m    Body mass index is 22.26 kg/m .  Physical Exam   GENERAL: healthy, alert and no distress  EYES: Eyes grossly normal to inspection, PERRL and conjunctivae and sclerae normal  NECK: no adenopathy, no asymmetry, masses, or scars and thyroid normal to palpation  RESP: lungs clear to auscultation - no rales, rhonchi or wheezes  CV: regular rate and rhythm, normal S1 S2, no S3 or S4, no murmur, click or rub, no peripheral edema and peripheral pulses strong  MS: no gross musculoskeletal defects noted, no edema  NEURO: Normal strength and tone, mentation intact and speech normal  PSYCH: mentation appears normal, affect normal/bright    Results for orders placed or performed in visit on 10/28/21   Basic metabolic panel  (Ca, Cl, CO2, Creat, Gluc, K, Na, BUN)     Status: Normal   Result Value Ref Range    Sodium 138 133 - 144 mmol/L    Potassium 4.1 3.4 - 5.3 mmol/L    Chloride 107 94 - 109 mmol/L    Carbon Dioxide (CO2) 28 20 - 32 mmol/L    Anion Gap 3 3 - 14 mmol/L    Urea Nitrogen 11 7 - 30 mg/dL    Creatinine 1.08 0.66 - 1.25 mg/dL    Calcium 8.7 8.5 - 10.1 mg/dL    Glucose 99 70 - 99 mg/dL    GFR Estimate 76 >60 mL/min/1.73m2

## 2021-10-28 NOTE — PATIENT INSTRUCTIONS
Recommend to repeat electrolytes and kidney function    Start Wellbutrin 150 mg daily morning 2-3 weeks before you set quit day. Use nicotine replacement after you quit (patches, gums)    Start Trazodone 50 mg 30 minutes before bedtime    Lung cancer screening     Stress ECHO to check your heart

## 2021-11-02 ENCOUNTER — HOSPITAL ENCOUNTER (OUTPATIENT)
Dept: CT IMAGING | Facility: CLINIC | Age: 56
Discharge: HOME | End: 2021-11-02
Attending: NURSE PRACTITIONER | Admitting: NURSE PRACTITIONER
Payer: COMMERCIAL

## 2021-11-02 ENCOUNTER — MYC MEDICAL ADVICE (OUTPATIENT)
Dept: FAMILY MEDICINE | Facility: CLINIC | Age: 56
End: 2021-11-02

## 2021-11-02 DIAGNOSIS — Z72.0 TOBACCO ABUSE: ICD-10-CM

## 2021-11-02 PROCEDURE — 71271 CT THORAX LUNG CANCER SCR C-: CPT

## 2021-11-02 NOTE — LETTER
November 2, 2021      Mark Nathan  [address] [place] [id number]        Dear Employer,    Pt may return to work with out any restrictions.          Sincerely,        KRISTAL Frey CNP

## 2021-11-03 NOTE — TELEPHONE ENCOUNTER
The letter was copied and pasted in a MyChart reply to pt.  Please mail signed original to pt if this has not been done yet.    Vidya Burroughs RN

## 2022-01-15 ENCOUNTER — HEALTH MAINTENANCE LETTER (OUTPATIENT)
Age: 57
End: 2022-01-15

## 2022-11-24 NOTE — TELEPHONE ENCOUNTER
Letter pended for Babs, for return to work.  Pt would like it sent to MY Chart asap. He goes back to work tomorrow.   Acute systolic congestive heart failure

## 2023-04-22 ENCOUNTER — HEALTH MAINTENANCE LETTER (OUTPATIENT)
Age: 58
End: 2023-04-22

## 2024-06-29 ENCOUNTER — HEALTH MAINTENANCE LETTER (OUTPATIENT)
Age: 59
End: 2024-06-29